# Patient Record
Sex: FEMALE | Race: BLACK OR AFRICAN AMERICAN | Employment: PART TIME | RURAL
[De-identification: names, ages, dates, MRNs, and addresses within clinical notes are randomized per-mention and may not be internally consistent; named-entity substitution may affect disease eponyms.]

---
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
## 2022-01-03 NOTE — PROGRESS NOTES
Sp/w patient, confirmed with 2 identifiers. Advised of positive results. Informed pt that if she starts having trouble breathing and is unable to speak in full sentences, pt needs to go to the nearest ER. PT expressed understanding.  KT

## 2022-01-05 ENCOUNTER — TELEPHONE (OUTPATIENT)
Dept: FAMILY MEDICINE CLINIC | Age: 22
End: 2022-01-05

## 2022-01-05 NOTE — TELEPHONE ENCOUNTER
----- Message from Cheli Ledezma sent at 1/5/2022 10:59 AM EST -----  Subject: Message to Provider    QUESTIONS  Information for Provider? The patient is calling today to request to have   results of Covid test sent to her email address? [email or web address]. Please call the patient to advise  ---------------------------------------------------------------------------  --------------  CALL BACK INFO  What is the best way for the office to contact you? OK to leave message on   [employer]  Preferred Call Back Phone Number? [phone]  ---------------------------------------------------------------------------  --------------  SCRIPT ANSWERS  Relationship to Patient?  Self

## 2022-03-15 ENCOUNTER — HOSPITAL ENCOUNTER (EMERGENCY)
Age: 22
Discharge: HOME | End: 2022-03-15
Attending: FAMILY MEDICINE
Payer: MEDICAID

## 2022-03-15 VITALS
WEIGHT: 160 LBS | HEIGHT: 68 IN | HEART RATE: 92 BPM | TEMPERATURE: 99.9 F | SYSTOLIC BLOOD PRESSURE: 121 MMHG | BODY MASS INDEX: 24.25 KG/M2 | DIASTOLIC BLOOD PRESSURE: 74 MMHG | OXYGEN SATURATION: 99 % | RESPIRATION RATE: 19 BRPM

## 2022-03-15 DIAGNOSIS — J02.9 VIRAL PHARYNGITIS: Primary | ICD-10-CM

## 2022-03-15 LAB — DEPRECATED S PYO AG THROAT QL EIA: NEGATIVE

## 2022-03-15 PROCEDURE — 99283 EMERGENCY DEPT VISIT LOW MDM: CPT

## 2022-03-15 PROCEDURE — 87070 CULTURE OTHR SPECIMN AEROBIC: CPT

## 2022-03-15 PROCEDURE — 87880 STREP A ASSAY W/OPTIC: CPT

## 2022-03-16 ENCOUNTER — VIRTUAL VISIT (OUTPATIENT)
Dept: FAMILY MEDICINE CLINIC | Age: 22
End: 2022-03-16
Payer: MEDICAID

## 2022-03-16 DIAGNOSIS — B97.89 SORE THROAT (VIRAL): Primary | ICD-10-CM

## 2022-03-16 DIAGNOSIS — J02.8 SORE THROAT (VIRAL): Primary | ICD-10-CM

## 2022-03-16 PROCEDURE — 99442 PR PHYS/QHP TELEPHONE EVALUATION 11-20 MIN: CPT | Performed by: NURSE PRACTITIONER

## 2022-03-16 RX ORDER — ACETAMINOPHEN 325 MG/1
TABLET ORAL
COMMUNITY

## 2022-03-16 NOTE — PROGRESS NOTES
Chief Complaint   Patient presents with    Sore Throat     f/[facility]     1. Have you been to the ER, urgent care clinic since your last visit? Hospitalized since your last visit? Yes [facility] for sore throat    2. Have you seen or consulted any other health care providers outside of the [address] [place] [address] since your last visit? Include any pap smears or colon screening. No  Abuse Screening Questionnaire 3/16/2022   Do you ever feel afraid of your partner? N   Are you in a relationship with someone who physically or mentally threatens you? N   Is it safe for you to go home? Y     3 most recent PHQ Screens 3/16/2022   Little interest or pleasure in doing things Not at all   Feeling down, depressed, irritable, or hopeless Not at all   Total Score PHQ 2 0   In the past year have you felt depressed or sad most days, even if you felt okay? -   Has there been a time in the past month when you have had serious thoughts about ending your life?  -   Have you ever in your whole life, tried to kill yourself or made a suicide attempt? -     Learning Assessment 1/30/2020   PRIMARY LEARNER Patient   BARRIERS PRIMARY LEARNER -   CO-LEARNER CAREGIVER -   PRIMARY LANGUAGE ENGLISH   LEARNER PREFERENCE PRIMARY LISTENING     DEMONSTRATION   ANSWERED BY patient   RELATIONSHIP SELF

## 2022-03-16 NOTE — PROGRESS NOTES
Darian Dupree is a 25 y.o. female, evaluated via audio-only technology on 3/16/2022 for Sore Throat (f/u [facility] ER last night)  c/o sore throat for the last 3 days. She has been taking diphenhydramine and guaifenesin for her symptoms but the do not seem to be improving. She has had no cough, congestion, fevers, abdominal pain, headaches, vomiting or diarrhea. Reviewed ER visit negative for strep. At present Ms. Otto Ruiz is feeling better today and requests a note for work. To return to work on 3/17/2022    Assessment & Plan:   Diagnoses and all orders for this visit:    1. Sore throat (viral)      The complexity of medical decision making for this visit is moderate         12  Subjective:       Prior to Admission medications    Medication Sig Start Date End Date Taking? Authorizing Provider   acetaminophen (TylenoL) 325 mg tablet Take  by mouth every four (4) hours as needed for Pain. Yes Provider, Historical   albuterol (PROVENTIL VENTOLIN) 2.5 mg /3 mL (0.083 %) nebu USE 1 VIAL VIA NEBULIZER EVERY 4 HOURS 10/6/21  Yes Vadim Cota NP   fluticasone propion-salmeteroL (ADVAIR/WIXELA) 100-50 mcg/dose diskus inhaler Take 1 Puff by inhalation two (2) times a day. 10/5/21  Yes Pio Ann MD   Nebulizer Accessories kit DX ; Asthma ( J45)   to be used with nebulizer as directed. 10/6/21   Vadim Cota NP   Dayna Hoose Inhub 250-50 mcg/dose diskus inhaler INHALE 1 PUFF BY MOUTH TWICE DAILY  Patient not taking: Reported on 3/16/2022 3/17/21   Harriett BURGOS, DO   inhalational spacing device 1 Each by Does Not Apply route as needed for Wheezing. Patient not taking: Reported on 3/16/2022 1/26/21   Lore Rogers,    albuterol (ProAir HFA) 90 mcg/actuation inhaler Take 2 Puffs by inhalation every four (4) hours as needed for Wheezing, Shortness of Breath, Respiratory Distress or Cough.   Patient not taking: Reported on 3/16/2022 9/25/20   Edel Roper MD   pimecrolimus (ELIDEL) 1 % topical cream Apply to affected area two (2) times a day. Patient not taking: Reported on 3/16/2022 9/11/20   Susie Sen NP   triamcinolone acetonide (KENALOG) 0.1 % ointment Apply  to affected area two (2) times a day. use thin layer  Patient not taking: Reported on 3/16/2022 4/27/20   Susie Sen NP   hydrOXYzine pamoate (VistariL) 25 mg capsule Take 1 Cap by mouth three (3) times daily as needed for Itching. Patient not taking: Reported on 3/16/2022 4/21/20   Lizbeth Persaud MD   montelukast (SINGULAIR) 10 mg tablet Take 1 Tab by mouth daily. Indications: controller medication for asthma  Patient not taking: Reported on 3/16/2022 1/30/20   Susie Sen NP   hydrocortisone valerate (WEST-GRECIA) 0.2 % ointment Apply  to affected area two (2) times a day. use thin layer  Patient not taking: Reported on 3/16/2022 12/30/19   Susie Sen NP     Patient Active Problem List   Diagnosis Code    Moderate persistent asthma without complication B52.02    Intrinsic eczema L20.84     Current Outpatient Medications   Medication Sig Dispense Refill    acetaminophen (TylenoL) 325 mg tablet Take  by mouth every four (4) hours as needed for Pain.  albuterol (PROVENTIL VENTOLIN) 2.5 mg /3 mL (0.083 %) nebu USE 1 VIAL VIA NEBULIZER EVERY 4 HOURS 30 Nebule 0    fluticasone propion-salmeteroL (ADVAIR/WIXELA) 100-50 mcg/dose diskus inhaler Take 1 Puff by inhalation two (2) times a day. 60 Each 0    Nebulizer Accessories kit DX ; Asthma ( J45)   to be used with nebulizer as directed. 1 Kit 0    Wixela Inhub 250-50 mcg/dose diskus inhaler INHALE 1 PUFF BY MOUTH TWICE DAILY (Patient not taking: Reported on 3/16/2022) 60 Each 5    inhalational spacing device 1 Each by Does Not Apply route as needed for Wheezing.  (Patient not taking: Reported on 3/16/2022) 1 Device 0    albuterol (ProAir HFA) 90 mcg/actuation inhaler Take 2 Puffs by inhalation every four (4) hours as needed for Wheezing, Shortness of Breath, Respiratory Distress or Cough. (Patient not taking: Reported on 3/16/2022) 1 Inhaler 2    pimecrolimus (ELIDEL) 1 % topical cream Apply  to affected area two (2) times a day. (Patient not taking: Reported on 3/16/2022) 30 g 0    triamcinolone acetonide (KENALOG) 0.1 % ointment Apply  to affected area two (2) times a day. use thin layer (Patient not taking: Reported on 3/16/2022) 30 g 0    hydrOXYzine pamoate (VistariL) 25 mg capsule Take 1 Cap by mouth three (3) times daily as needed for Itching. (Patient not taking: Reported on 3/16/2022) 20 Cap 0    montelukast (SINGULAIR) 10 mg tablet Take 1 Tab by mouth daily. Indications: controller medication for asthma (Patient not taking: Reported on 3/16/2022) 30 Tab 2    hydrocortisone valerate (WEST-GRECIA) 0.2 % ointment Apply  to affected area two (2) times a day. use thin layer (Patient not taking: Reported on 3/16/2022) 15 g 0     Allergies   Allergen Reactions    Rocephin [Ceftriaxone] Hives, Shortness of Breath and Swelling     Past Medical History:   Diagnosis Date    Asthma     Blood type B+     Bronchitis chronic     Community acquired pneumonia     Eczema     Otitis media     Reactive airway disease     Vision decreased 12/15/2011    conjunctivitis & early periorbital cellulitis     History reviewed. No pertinent surgical history. Family History   Problem Relation Age of Onset    Hypertension Father     Diabetes Sister     No Known Problems Mother      Social History     Tobacco Use    Smoking status: Never Smoker    Smokeless tobacco: Never Used   Substance Use Topics    Alcohol use: No       ROS  Pertinent items are noted on the HPI  Patient-Reported LMP: 1-       James Frankel was evaluated through a patient-initiated, synchronous (real-time) audio only encounter. She (or guardian if applicable) is aware that it is a billable service, which includes applicable co-pays, with coverage as determined by her insurance carrier.  This visit was conducted with the patient's (and/or Janiya Rojas guardian's) verbal consent. She has not had a related appointment within my department in the past 7 days or scheduled within the next 24 hours. The patient was located in a state where the provider was licensed to provide care.     Total Time: minutes: 11-20 minutes    Claudell Echevaria, NP

## 2022-03-16 NOTE — DISCHARGE INSTRUCTIONS
--Tylenol 1000 mg every 6 hours as needed for pain. --Ibuprofen 600 mg every 6 hours as needed for pain. --Cepacol lozenges as needed.

## 2022-03-16 NOTE — LETTER
NOTIFICATION RETURN TO WORK / SCHOOL    3/16/2022 1:03 PM    Ms. Abbe Hank [place] [id number]      To Whom It May Concern:    Mayco Alarcon is currently under the care of Megan Mullins. She will return to work: 03/17/2022    If there are questions or concerns please have the patient contact our office.         Sincerely,      Jesus Luo, NP

## 2022-03-18 LAB
BACTERIA SPEC CULT: NORMAL
SERVICE CMNT-IMP: NORMAL

## 2022-03-20 PROBLEM — J45.40 MODERATE PERSISTENT ASTHMA WITHOUT COMPLICATION: Status: ACTIVE | Noted: 2021-01-30

## 2022-03-20 PROBLEM — L20.84 INTRINSIC ECZEMA: Status: ACTIVE | Noted: 2021-01-30

## 2022-03-23 DIAGNOSIS — J45.40 MODERATE PERSISTENT ASTHMA WITHOUT COMPLICATION: ICD-10-CM

## 2022-03-23 RX ORDER — ALBUTEROL SULFATE 90 UG/1
2 AEROSOL, METERED RESPIRATORY (INHALATION)
Qty: 1 EACH | Refills: 11 | Status: SHIPPED | OUTPATIENT
Start: 2022-03-23

## 2022-03-23 NOTE — TELEPHONE ENCOUNTER
The patient's mother Margaretmary Osler is calling to have a refill sent in for the Albuterol Inhaler to [facility] in [place], Her call back number is [phone]

## 2022-03-24 ENCOUNTER — TELEPHONE (OUTPATIENT)
Dept: FAMILY MEDICINE CLINIC | Age: 22
End: 2022-03-24

## 2022-03-24 NOTE — TELEPHONE ENCOUNTER
The patient mother Eduard Early is calling stating a refill was sent for inhaler but is needing to be preauthorized.  [facility] is the Pharmacy

## 2022-03-24 NOTE — TELEPHONE ENCOUNTER
Mother informed albuterol denied by insurance. Mother states she does not need this RX but her [address] [place] [address] .  This refill was sent to provider for approval.

## 2022-03-25 DIAGNOSIS — J45.40 MODERATE PERSISTENT ASTHMA WITHOUT COMPLICATION: ICD-10-CM

## 2022-03-25 RX ORDER — FLUTICASONE PROPIONATE AND SALMETEROL 100; 50 UG/1; UG/1
1 POWDER RESPIRATORY (INHALATION) 2 TIMES DAILY
Qty: 60 EACH | Refills: 0 | Status: SHIPPED | OUTPATIENT
Start: 2022-03-25

## 2022-03-25 RX ORDER — FLUTICASONE PROPIONATE AND SALMETEROL 250; 50 UG/1; UG/1
POWDER RESPIRATORY (INHALATION)
Qty: 60 EACH | Refills: 5 | Status: SHIPPED | OUTPATIENT
Start: 2022-03-25 | End: 2022-10-21

## 2022-09-03 NOTE — PROGRESS NOTES
Patient presented to the office for an outdoors Covid 19 test. to R heel in surgical shoe/weight-bearing as tolerated

## 2022-10-21 RX ORDER — FLUTICASONE PROPIONATE AND SALMETEROL 250; 50 UG/1; UG/1
POWDER RESPIRATORY (INHALATION)
Qty: 60 EACH | Refills: 5 | Status: SHIPPED | OUTPATIENT
Start: 2022-10-21

## 2022-11-16 ENCOUNTER — HOSPITAL ENCOUNTER (EMERGENCY)
Age: 22
Discharge: HOME | End: 2022-11-16
Attending: FAMILY MEDICINE
Payer: MEDICAID

## 2022-11-16 VITALS
TEMPERATURE: 101 F | DIASTOLIC BLOOD PRESSURE: 56 MMHG | HEART RATE: 111 BPM | OXYGEN SATURATION: 97 % | WEIGHT: 160 LBS | BODY MASS INDEX: 24.25 KG/M2 | HEIGHT: 68 IN | RESPIRATION RATE: 16 BRPM | SYSTOLIC BLOOD PRESSURE: 119 MMHG

## 2022-11-16 DIAGNOSIS — B34.9 VIRAL SYNDROME: Primary | ICD-10-CM

## 2022-11-16 LAB
DEPRECATED S PYO AG THROAT QL EIA: NEGATIVE
FLUAV AG NPH QL IA: NEGATIVE
FLUBV AG NOSE QL IA: NEGATIVE

## 2022-11-16 PROCEDURE — 99283 EMERGENCY DEPT VISIT LOW MDM: CPT

## 2022-11-16 PROCEDURE — 87147 CULTURE TYPE IMMUNOLOGIC: CPT

## 2022-11-16 PROCEDURE — 87070 CULTURE OTHR SPECIMN AEROBIC: CPT

## 2022-11-16 PROCEDURE — 87804 INFLUENZA ASSAY W/OPTIC: CPT

## 2022-11-16 PROCEDURE — 87880 STREP A ASSAY W/OPTIC: CPT

## 2022-11-16 PROCEDURE — 74011250637 HC RX REV CODE- 250/637: Performed by: FAMILY MEDICINE

## 2022-11-16 RX ORDER — IBUPROFEN 600 MG/1
600 TABLET ORAL
Status: COMPLETED | OUTPATIENT
Start: 2022-11-16 | End: 2022-11-16

## 2022-11-16 RX ORDER — ALBUTEROL SULFATE 0.83 MG/ML
2.5 SOLUTION RESPIRATORY (INHALATION)
Qty: 25 ML | Refills: 0 | Status: SHIPPED | OUTPATIENT
Start: 2022-11-16

## 2022-11-16 RX ADMIN — IBUPROFEN 600 MG: 600 TABLET, FILM COATED ORAL at 21:45

## 2022-11-16 NOTE — Clinical Note
[address] [place] [address] EMERGENCY DEP  [address] [facility] [address] [place] [id number]  [phone]    Work/School Note    Date: 11/16/2022    To Whom It May concern:    Briana Giron was seen and treated today in the emergency room by the following provider(s):  Attending Provider: Arvind Valdes MD.      Briana Giron is excused from work/school on 11/16/2022 through 11/18/2022. She is medically clear to return to work/school on 11/19/2022.          Sincerely,          Lita Mendoza MD

## 2022-11-17 NOTE — DISCHARGE INSTRUCTIONS
--Ibuprofen 600 mg every 6 hours as needed for pain/fever. --Tylenol 1000 mg every 6 hours as needed for pain/fever. --Drink plenty of fluids.

## 2022-11-17 NOTE — ED PROVIDER NOTES
EMERGENCY DEPARTMENT HISTORY AND PHYSICAL EXAM          Date: 11/16/2022  Patient Name: Xiomara Higigns    History of Presenting Illness     Chief Complaint   Patient presents with    Fever    Flu Like Symptoms    Sore Throat       History Provided By: Patient    HPI: Xiomara Higgins is a 23 y.o. female, pmhx below, who presents to the ED with fever and sore throat that started this morning. No significant nasal congestion or cough. No ill contacts, that she is aware of. No Tylenol or ibuprofen at home. Able to drink fluids but it is painful. She has had no abdominal pain or nausea/vomiting/diarrhea. PCP: Minor Newsome NP    Allergies: Rocephin  Social Hx: No tobacco, vaping, EtOH, Illicit Drugs; Lives locally. Works at [employer]. There are no other complaints, changes, or physical findings at this time. Current Outpatient Medications   Medication Sig Dispense Refill    albuterol (PROVENTIL VENTOLIN) 2.5 mg /3 mL (0.083 %) nebu 3 mL by Nebulization route every four (4) hours as needed for Wheezing. 25 mL 0    fluticasone propion-salmeteroL (Advair Diskus) 250-50 mcg/dose diskus inhaler INHALE 1 PUFF BY MOUTH TWICE DAILY 60 Each 5    fluticasone propion-salmeteroL (ADVAIR/WIXELA) 100-50 mcg/dose diskus inhaler Take 1 Puff by inhalation two (2) times a day. 60 Each 0    acetaminophen (TylenoL) 325 mg tablet Take  by mouth every four (4) hours as needed for Pain. Nebulizer Accessories kit DX ; Asthma ( J45)   to be used with nebulizer as directed. 1 Kit 0    inhalational spacing device 1 Each by Does Not Apply route as needed for Wheezing. (Patient not taking: Reported on 3/16/2022) 1 Device 0    pimecrolimus (ELIDEL) 1 % topical cream Apply  to affected area two (2) times a day. (Patient not taking: Reported on 3/16/2022) 30 g 0    triamcinolone acetonide (KENALOG) 0.1 % ointment Apply  to affected area two (2) times a day.  use thin layer (Patient not taking: Reported on 3/16/2022) 30 g 0 hydrOXYzine pamoate (VistariL) 25 mg capsule Take 1 Cap by mouth three (3) times daily as needed for Itching. (Patient not taking: Reported on 3/16/2022) 20 Cap 0    montelukast (SINGULAIR) 10 mg tablet Take 1 Tab by mouth daily. Indications: controller medication for asthma (Patient not taking: Reported on 3/16/2022) 30 Tab 2    hydrocortisone valerate (WEST-GRECIA) 0.2 % ointment Apply  to affected area two (2) times a day. use thin layer (Patient not taking: Reported on 3/16/2022) 15 g 0       Past History     Past Medical History:  Past Medical History:   Diagnosis Date    Asthma     Blood type B+     Bronchitis chronic     Community acquired pneumonia     Eczema     Otitis media     Reactive airway disease     Vision decreased 12/15/2011    conjunctivitis & early periorbital cellulitis       Past Surgical History:  No past surgical history on file. Family History:  Family History   Problem Relation Age of Onset    Hypertension Father     Diabetes Sister     No Known Problems Mother        Social History:  Social History     Tobacco Use    Smoking status: Never    Smokeless tobacco: Never   Substance Use Topics    Alcohol use: No    Drug use: Never       Allergies: Allergies   Allergen Reactions    Rocephin [Ceftriaxone] Hives, Shortness of Breath and Swelling         Review of Systems   Review of Systems   Constitutional:  Positive for fever. Negative for appetite change. HENT:  Positive for sore throat. Respiratory:  Negative for cough and shortness of breath. Gastrointestinal:  Negative for diarrhea, nausea and vomiting. Physical Exam   Physical Exam  Vitals reviewed. Constitutional:       Appearance: She is normal weight. HENT:      Head: Normocephalic. Right Ear: Ear canal normal.      Left Ear: Ear canal normal.      Nose: No congestion. Mouth/Throat:      Mouth: Mucous membranes are moist. No oral lesions. Pharynx: Uvula midline. No uvula swelling.       Tonsils: No tonsillar exudate or tonsillar abscesses. 1+ on the right. 1+ on the left. Eyes:      Conjunctiva/sclera: Conjunctivae normal.      Pupils: Pupils are equal, round, and reactive to light. Cardiovascular:      Rate and Rhythm: Regular rhythm. Tachycardia present. Heart sounds: Normal heart sounds. Pulmonary:      Effort: Pulmonary effort is normal.      Breath sounds: Normal breath sounds. Skin:     General: Skin is warm and dry. Neurological:      Mental Status: She is alert. Diagnostic Study Results     Labs -     Recent Results (from the past 12 hour(s))   STREP AG SCREEN, GROUP A    Collection Time: 11/16/22  9:35 PM    Specimen: Swab; Throat   Result Value Ref Range    Group A Strep Ag ID Negative NEG     INFLUENZA A+B VIRAL AGS    Collection Time: 11/16/22  9:50 PM   Result Value Ref Range    Influenza A Antigen Negative NEG      Influenza B Antigen Negative NEG             Medical Decision Making   I am the first provider for this patient. I reviewed the vital signs, available nursing notes, past medical history, past surgical history, family history and social history. Vital Signs-Reviewed the patient's vital signs. Patient Vitals for the past 12 hrs:   Temp Pulse Resp BP SpO2   11/16/22 2250 (!) 101 °F (38.3 °C) (!) 111 16 (!) 119/56 97 %   11/16/22 2135 (!) 103 °F (39.4 °C) (!) 120 18 120/68 99 %       Pulse Oximetry Analysis - 97% on RA    Records Reviewed: Nursing Notes and Old Medical Records    Provider Notes (Medical Decision Making):   MDM: [facility] woman with fever, sore throat, tachycardia. DDx: strep, flu, viral pharyngitis. Could also be COVID but not tested at this time. ED Course:   Initial assessment performed. The patients presenting problems have been discussed, and they are in agreement with the care plan formulated and outlined with them. I have encouraged them to ask questions as they arise throughout their visit.         PROGRESS NOTE:  Pt given 600 mg ibuprofen, which brought her fever down to 101 from 103 after about 90 minutes. She was given instructions for viral syndrome and a note for work. Discharge note:  Pt re-evaluated and noted to be feeling better, ready for discharge. Updated pt on all final lab findings. Will follow up as instructed. All questions have been answered, pt voiced understanding and agreement with plan. Specific return precautions provided as well as instructions to return to the ED should sx worsen at any time. Vital signs stable for discharge. Critical Care Time: 0      Diagnosis     Clinical Impression:   1. Viral syndrome        PLAN:    --Ibuprofen 600 mg every 6 hours as needed for pain/fever. --Tylenol 1000 mg every 6 hours as needed for pain/fever. --Drink plenty of fluids.     2.   Follow-up Information       Follow up With Specialties Details Why Contact Magalis Abarca NP Nurse Practitioner In 2 days If symptoms worsen Annika Montgomery  [address] [facility] [phone]            Return to ED if worse     Disposition:  Home

## 2022-11-18 LAB
BACTERIA SPEC CULT: ABNORMAL
BACTERIA SPEC CULT: ABNORMAL
SERVICE CMNT-IMP: ABNORMAL

## 2023-04-03 ENCOUNTER — OFFICE VISIT (OUTPATIENT)
Dept: FAMILY MEDICINE CLINIC | Age: 23
End: 2023-04-03
Payer: MEDICAID

## 2023-04-03 DIAGNOSIS — E01.0 THYROMEGALY: ICD-10-CM

## 2023-04-03 DIAGNOSIS — Z00.00 WELLNESS EXAMINATION: Primary | ICD-10-CM

## 2023-04-03 DIAGNOSIS — J30.1 SEASONAL ALLERGIC RHINITIS DUE TO POLLEN: ICD-10-CM

## 2023-04-03 DIAGNOSIS — L20.84 INTRINSIC ECZEMA: ICD-10-CM

## 2023-04-03 PROCEDURE — 99395 PREV VISIT EST AGE 18-39: CPT | Performed by: NURSE PRACTITIONER

## 2023-04-03 RX ORDER — TRIAMCINOLONE ACETONIDE 1 MG/G
CREAM TOPICAL 2 TIMES DAILY
Qty: 15 G | Refills: 0 | Status: SHIPPED | OUTPATIENT
Start: 2023-04-03

## 2023-04-03 RX ORDER — CETIRIZINE HYDROCHLORIDE 10 MG/1
10 TABLET ORAL DAILY
Qty: 30 TABLET | Refills: 2 | Status: SHIPPED | OUTPATIENT
Start: 2023-04-03

## 2023-04-03 RX ORDER — AZELASTINE 1 MG/ML
1 SPRAY, METERED NASAL DAILY
Qty: 1 EACH | Refills: 0 | Status: SHIPPED | OUTPATIENT
Start: 2023-04-03

## 2023-04-03 NOTE — PROGRESS NOTES
Chief Complaint   Patient presents with    Allergies       HPI:     is a 23 y.o. female in today for a check up and wellness visit. She is living at home and working at [employer] in [place]. She has a few concerns today. Firstly, she reports allergy symptoms. She is not taking any medicine for them right now. She also reports eczema, worse around the R eye. She has what she believes is a hemorrhoid x several years and can't seem to make it go away. She is not sexually active and never has been. Allergies   Allergen Reactions    Rocephin [Ceftriaxone] Hives, Shortness of Breath and Swelling       Current Outpatient Medications   Medication Sig    cetirizine (ZYRTEC) 10 mg tablet Take 1 Tablet by mouth daily. azelastine (ASTELIN) 137 mcg (0.1 %) nasal spray 1 Helen by Both Nostrils route daily. Use in each nostril as directed    triamcinolone acetonide (KENALOG) 0.1 % topical cream Apply  to affected area two (2) times a day. use thin layer    albuterol (PROVENTIL VENTOLIN) 2.5 mg /3 mL (0.083 %) nebu 3 mL by Nebulization route every four (4) hours as needed for Wheezing. fluticasone propion-salmeteroL (Advair Diskus) 250-50 mcg/dose diskus inhaler INHALE 1 PUFF BY MOUTH TWICE DAILY    fluticasone propion-salmeteroL (ADVAIR/WIXELA) 100-50 mcg/dose diskus inhaler Take 1 Puff by inhalation two (2) times a day. acetaminophen (TYLENOL) 325 mg tablet Take  by mouth every four (4) hours as needed for Pain. Nebulizer Accessories kit DX ; Asthma ( J45)   to be used with nebulizer as directed. inhalational spacing device 1 Each by Does Not Apply route as needed for Wheezing. No current facility-administered medications for this visit.        Past Medical History:   Diagnosis Date    Asthma     Blood type B+     Bronchitis chronic     Community acquired pneumonia     Eczema     Otitis media     Reactive airway disease     Vision decreased 12/15/2011    conjunctivitis & early periorbital cellulitis       Family History   Problem Relation Age of Onset    Hypertension Father     Diabetes Sister     No Known Problems Mother          Review of Systems    A comprehensive review of systems was negative except for that written in the HPI. Patient is not experiencing chest pain radiating to the jaw and/or down the arms. Physical Examination:    /60 (BP 1 Location: Right arm, BP Patient Position: Sitting, BP Cuff Size: Adult long)   Pulse (!) 101   Temp 98.4 °F (36.9 °C) (Oral)   Resp 22   Ht 5' 8\" (1.727 m)   Wt 160 lb 9.6 oz (72.8 kg)   SpO2 98%   BMI 24.42 kg/m²     Wt Readings from Last 3 Encounters:   04/03/23 160 lb 9.6 oz (72.8 kg)   11/16/22 160 lb (72.6 kg)   03/15/22 160 lb (72.6 kg) (88 %, Z= 1.16)*     * Growth percentiles are based on [facility] (Girls, 2-20 Years) data. Constitutional: WDWN Female in no acute distress  HENT:  NC/AT, TMs pearly gray, OP: clear  EYES: EOMI, PERRL  Neck:  Supple, no JVD, mass or bruit. + thyromegaly without palpable nodule. Respiratory:  Respirations even and unlabored without use of accessory muscles, CTA throughout without wheezes, rales, rubs or rhonchi. Symmetrical chest expansion. Cardiac: RRR no clicks, murmurs, gallops, or rubs  : Fleshy skin tag to anus  Musculoskeletal:  No cyanosis, clubbing or edema of extremities. Moves all extremities without difficulty. Neurologic:  Smooth, even gait without assistance, CN 2-12 grossly intact. Skin: intact and warm to the touch, erythematous fine papular rash to R eyelid  Lymphadenopathy: no cervical or supraclavicular nodes  Psych: Pleasant and appropriate. Judgment normal. Alert and oriented x 3. ASSESSMENT AND PLAN:       ICD-10-CM ICD-9-CM    1. Wellness examination  Z00.00 V70.0       2. Thyromegaly  E01.0 240.9 US THYROID/PARATHYROID/SOFT TISS      3. Intrinsic eczema  L20.84 691.8       4. Seasonal allergic rhinitis due to pollen  J30.1 477.0         Tx as above.  Thyroid USN ordered at \[facility]\"". Discussed atopy, how allergies, asthma, and eczema go hand in hand. Contact inform provided for Dr. Fam Mitchell, dermatologist. She declines labs today. Medication Side Effects and Warnings were discussed with patient:  yes  Patient Labs were reviewed:  yes  Patient Past Records were reviewed:  yes    Patient aware of plan of care and verbalized understanding. Questions answered. RTC yearly, or sooner if needed.     Jessica Lopez NP

## 2023-04-03 NOTE — PATIENT INSTRUCTIONS
We are not drawing any blood today. You need to think about coming back in November or later to do a pelvic exam and pap smear. I did notice your thyroid gland on your neck feels a little large. I want you to get an ultrasound at the hospital in [facility]. You will need to call the number on the slip of paper I gave you today to set this up once you get your work schedule. You will tell them your name and date of birth and that you want it done at [facility]. We will call you back when we see the results. I am not worried about this, I am doing this out of an abundance of caution. I'm sending in a daily antihistamine for allergies and a nasal spray. Use these in the morning. We use Dr. Margoth Shetty office for dermatology. His number is [phone]. You can call and make your own appt. I hear Jon Mora is really good!

## 2023-04-03 NOTE — PROGRESS NOTES
YES Answers must have Comments  1. \"Have you been to the ER, urgent care clinic since your last visit? Hospitalized since your last visit? \"    [] YES   [x] NO       2. Have you seen or consulted any other health care providers outside of [address] [place] [address] since your last visit?     [] YES   [x] NO       3. For patients aged 39-70: Have you had a colorectal cancer screening such as a colonoscopy/FIT/Cologuard? Nurse/CMA to request records if not in chart   [] YES   [x] NO   [] NA, based on age    If the patient is female:      4. For female patients aged 41-77: [facility] Six you had a mammogram in the last two years?  Nurse/CMA to request records if not in chart   [] YES   [x] NO   [] NA, based on age    11. For female patients aged 21-65: [facility] Six you had a pap smear?   Nurse/CMA to request records if not in chart   [] YES   [x] NO  [] NA, based on age

## 2023-05-05 RX ORDER — FLUTICASONE PROPIONATE AND SALMETEROL 250; 50 UG/1; UG/1
POWDER RESPIRATORY (INHALATION)
Qty: 60 EACH | Refills: 5 | Status: SHIPPED | OUTPATIENT
Start: 2023-05-05

## 2023-07-03 RX ORDER — FLUTICASONE PROPIONATE AND SALMETEROL 100; 50 UG/1; UG/1
POWDER RESPIRATORY (INHALATION)
Qty: 60 EACH | Refills: 1 | OUTPATIENT
Start: 2023-07-03

## 2023-07-03 RX ORDER — FLUTICASONE PROPIONATE AND SALMETEROL 250; 50 UG/1; UG/1
POWDER RESPIRATORY (INHALATION)
Qty: 60 EACH | Refills: 1 | Status: SHIPPED | OUTPATIENT
Start: 2023-07-03

## 2023-07-03 RX ORDER — FLUTICASONE PROPIONATE AND SALMETEROL 100; 50 UG/1; UG/1
POWDER RESPIRATORY (INHALATION)
Qty: 60 EACH | Refills: 1 | Status: SHIPPED | OUTPATIENT
Start: 2023-07-03

## 2023-07-03 RX ORDER — FLUTICASONE PROPIONATE AND SALMETEROL 50; 250 UG/1; UG/1
POWDER RESPIRATORY (INHALATION)
Qty: 60 EACH | Refills: 1 | Status: SHIPPED | OUTPATIENT
Start: 2023-07-03

## 2023-07-03 NOTE — TELEPHONE ENCOUNTER
Last OV 04/03/2023  Requested Prescriptions     Pending Prescriptions Disp Refills    fluticasone-salmeterol (ADVAIR) 250-50 MCG/ACT AEPB diskus inhaler [Pharmacy Med Name: FLUTICASONE/SALM DISK 250/50MCG 60S] 60 each 1     Sig: INHALE 1 PUFF BY MOUTH TWICE DAILY

## 2023-07-03 NOTE — TELEPHONE ENCOUNTER
Last OV 3/16/2022  Requested Prescriptions     Pending Prescriptions Disp Refills    ADVAIR DISKUS 250-50 MCG/ACT AEPB diskus inhaler [Pharmacy Med Name: ADVAIR DISKUS 250/50MCG (YELLOW) 60] 60 each 1     Sig: INHALE 1 PUFF BY MOUTH TWICE DAILY

## 2023-07-03 NOTE — TELEPHONE ENCOUNTER
Last OV 3/16/2022  Requested Prescriptions     Pending Prescriptions Disp Refills    WIXELA INHUB 100-50 MCG/ACT AEPB diskus inhaler [Pharmacy Med Name: Dineshert Collie DISKUS 100/50MCG 60S] 60 each 1     Sig: INHALE 1 PUFF BY MOUTH TWICE DAILY     Refused Prescriptions Disp Refills    WIXELA INHUB 100-50 MCG/ACT AEPB diskus inhaler [Pharmacy Med Name: Dineshert Collie DISKUS 100/50MCG 60S] 60 each      Sig: INHALE 1 PUFF BY MOUTH TWICE DAILY

## 2023-07-06 ENCOUNTER — TELEPHONE (OUTPATIENT)
Age: 23
End: 2023-07-06

## 2023-07-10 RX ORDER — FLUTICASONE PROPIONATE AND SALMETEROL 250; 50 UG/1; UG/1
POWDER RESPIRATORY (INHALATION)
Qty: 60 EACH | Refills: 11 | Status: SHIPPED | OUTPATIENT
Start: 2023-07-10

## 2023-07-13 RX ORDER — CETIRIZINE HYDROCHLORIDE 10 MG/1
10 TABLET ORAL DAILY
COMMUNITY
Start: 2023-06-10 | End: 2023-07-13 | Stop reason: SDUPTHER

## 2023-07-13 RX ORDER — CETIRIZINE HYDROCHLORIDE 10 MG/1
10 TABLET ORAL DAILY
Qty: 90 TABLET | Refills: 1 | Status: SHIPPED | OUTPATIENT
Start: 2023-07-13

## 2023-07-13 RX ORDER — CETIRIZINE HYDROCHLORIDE 10 MG/1
TABLET ORAL
Qty: 30 TABLET | Refills: 5 | OUTPATIENT
Start: 2023-07-13

## 2023-09-05 ENCOUNTER — OFFICE VISIT (OUTPATIENT)
Age: 23
End: 2023-09-05
Payer: MEDICAID

## 2023-09-05 VITALS
SYSTOLIC BLOOD PRESSURE: 118 MMHG | HEIGHT: 68 IN | DIASTOLIC BLOOD PRESSURE: 76 MMHG | BODY MASS INDEX: 24.92 KG/M2 | WEIGHT: 164.4 LBS | HEART RATE: 93 BPM | OXYGEN SATURATION: 100 %

## 2023-09-05 DIAGNOSIS — J30.1 SEASONAL ALLERGIC RHINITIS DUE TO POLLEN: Primary | ICD-10-CM

## 2023-09-05 DIAGNOSIS — J02.8 ACUTE PHARYNGITIS DUE TO OTHER SPECIFIED ORGANISMS: ICD-10-CM

## 2023-09-05 DIAGNOSIS — J45.40 MODERATE PERSISTENT ASTHMA WITHOUT COMPLICATION: ICD-10-CM

## 2023-09-05 PROCEDURE — 99214 OFFICE O/P EST MOD 30 MIN: CPT | Performed by: NURSE PRACTITIONER

## 2023-09-05 RX ORDER — FLUTICASONE PROPIONATE AND SALMETEROL 250; 50 UG/1; UG/1
POWDER RESPIRATORY (INHALATION)
Qty: 60 EACH | Refills: 11 | Status: SHIPPED | OUTPATIENT
Start: 2023-09-05

## 2023-09-05 RX ORDER — CLINDAMYCIN HYDROCHLORIDE 300 MG/1
300 CAPSULE ORAL 2 TIMES DAILY
Qty: 20 CAPSULE | Refills: 0 | Status: SHIPPED | OUTPATIENT
Start: 2023-09-05 | End: 2023-09-15

## 2023-09-05 RX ORDER — MONTELUKAST SODIUM 10 MG/1
10 TABLET ORAL NIGHTLY
Qty: 30 TABLET | Refills: 5 | Status: SHIPPED | OUTPATIENT
Start: 2023-09-05

## 2023-09-05 NOTE — PATIENT INSTRUCTIONS
Continue cetirizine in the morning. Add prescription montelukast at bedtime. Take these daily for sinus. I'm also sending in a 10 day course of antibiotics to take for sinuses. Come back in 2-3 weeks to follow up.

## 2023-09-27 ENCOUNTER — OFFICE VISIT (OUTPATIENT)
Age: 23
End: 2023-09-27
Payer: MEDICAID

## 2023-09-27 VITALS
WEIGHT: 165.8 LBS | DIASTOLIC BLOOD PRESSURE: 70 MMHG | TEMPERATURE: 97.9 F | SYSTOLIC BLOOD PRESSURE: 110 MMHG | OXYGEN SATURATION: 98 % | BODY MASS INDEX: 25.13 KG/M2 | RESPIRATION RATE: 20 BRPM | HEART RATE: 88 BPM | HEIGHT: 68 IN

## 2023-09-27 DIAGNOSIS — H10.33 ACUTE BACTERIAL CONJUNCTIVITIS OF BOTH EYES: ICD-10-CM

## 2023-09-27 DIAGNOSIS — H61.23 BILATERAL IMPACTED CERUMEN: ICD-10-CM

## 2023-09-27 DIAGNOSIS — J45.40 MODERATE PERSISTENT ASTHMA WITHOUT COMPLICATION: Primary | ICD-10-CM

## 2023-09-27 DIAGNOSIS — J30.89 NON-SEASONAL ALLERGIC RHINITIS, UNSPECIFIED TRIGGER: ICD-10-CM

## 2023-09-27 PROCEDURE — 99213 OFFICE O/P EST LOW 20 MIN: CPT | Performed by: NURSE PRACTITIONER

## 2023-09-27 PROCEDURE — 69209 REMOVE IMPACTED EAR WAX UNI: CPT | Performed by: NURSE PRACTITIONER

## 2023-09-27 RX ORDER — POLYMYXIN B SULFATE AND TRIMETHOPRIM 1; 10000 MG/ML; [USP'U]/ML
1 SOLUTION OPHTHALMIC EVERY 6 HOURS
Qty: 2 ML | Refills: 0 | Status: SHIPPED | OUTPATIENT
Start: 2023-09-27 | End: 2023-10-07

## 2023-09-27 RX ORDER — FLUTICASONE PROPIONATE 50 MCG
2 SPRAY, SUSPENSION (ML) NASAL DAILY
Qty: 48 G | Refills: 1 | Status: SHIPPED | OUTPATIENT
Start: 2023-09-27

## 2023-09-27 ASSESSMENT — PATIENT HEALTH QUESTIONNAIRE - PHQ9
SUM OF ALL RESPONSES TO PHQ QUESTIONS 1-9: 0
SUM OF ALL RESPONSES TO PHQ QUESTIONS 1-9: 0
2. FEELING DOWN, DEPRESSED OR HOPELESS: 0
SUM OF ALL RESPONSES TO PHQ QUESTIONS 1-9: 0
SUM OF ALL RESPONSES TO PHQ QUESTIONS 1-9: 0

## 2023-10-24 ENCOUNTER — NURSE ONLY (OUTPATIENT)
Age: 23
End: 2023-10-24

## 2023-10-24 DIAGNOSIS — Z02.1 PHYSICAL EXAM, PRE-EMPLOYMENT: Primary | ICD-10-CM

## 2023-10-26 ENCOUNTER — OFFICE VISIT (OUTPATIENT)
Age: 23
End: 2023-10-26
Payer: MEDICAID

## 2023-10-26 VITALS
HEART RATE: 98 BPM | WEIGHT: 164 LBS | TEMPERATURE: 97.2 F | DIASTOLIC BLOOD PRESSURE: 70 MMHG | BODY MASS INDEX: 25.74 KG/M2 | HEIGHT: 67 IN | RESPIRATION RATE: 20 BRPM | OXYGEN SATURATION: 97 % | SYSTOLIC BLOOD PRESSURE: 110 MMHG

## 2023-10-26 DIAGNOSIS — J45.40 MODERATE PERSISTENT ASTHMA WITHOUT COMPLICATION: ICD-10-CM

## 2023-10-26 PROCEDURE — 99213 OFFICE O/P EST LOW 20 MIN: CPT | Performed by: NURSE PRACTITIONER

## 2023-10-26 RX ORDER — FLUTICASONE PROPIONATE AND SALMETEROL 250; 50 UG/1; UG/1
POWDER RESPIRATORY (INHALATION)
Qty: 60 EACH | Refills: 11 | Status: SHIPPED | OUTPATIENT
Start: 2023-10-26

## 2023-10-26 RX ORDER — MONTELUKAST SODIUM 10 MG/1
10 TABLET ORAL NIGHTLY
Qty: 30 TABLET | Refills: 0 | Status: SHIPPED | OUTPATIENT
Start: 2023-10-26

## 2023-10-26 RX ORDER — CETIRIZINE HYDROCHLORIDE 10 MG/1
10 TABLET ORAL DAILY
Qty: 90 TABLET | Refills: 1 | Status: SHIPPED | OUTPATIENT
Start: 2023-10-26

## 2023-10-26 ASSESSMENT — PATIENT HEALTH QUESTIONNAIRE - PHQ9
SUM OF ALL RESPONSES TO PHQ QUESTIONS 1-9: 0
2. FEELING DOWN, DEPRESSED OR HOPELESS: 0
1. LITTLE INTEREST OR PLEASURE IN DOING THINGS: 0
SUM OF ALL RESPONSES TO PHQ QUESTIONS 1-9: 0
SUM OF ALL RESPONSES TO PHQ9 QUESTIONS 1 & 2: 0
SUM OF ALL RESPONSES TO PHQ QUESTIONS 1-9: 0
SUM OF ALL RESPONSES TO PHQ QUESTIONS 1-9: 0

## 2023-10-26 NOTE — PROGRESS NOTES
Subjective:     Chintan Pop is a 23 y.o. female who presents today with the following:  Chief Complaint   Patient presents with    Asthma     refills       Patient Active Problem List   Diagnosis    Moderate persistent asthma without complication    Intrinsic eczema         COMPLIANT WITH MEDICATION:   Asthma; MS. Wilde Seen endorses that her asthma symptoms of shortness of breath and wheezing flare up in the fall. She is starting to experience symptoms  since last week. She is requesting a refill of Advair and Zyrtec. We discussed restarting singular and she is receptive. depression screening addressed not at risk    abuse screening addressed denies    learning assessment addressed reviewed nurses notes    fall risk addressed not at risk    HM: addressed She declines immunizations today.     ROS:  Gen: denies fever, chills, fatigue, weight loss, weight gain  HEENT:denies blurry vision, nasal congestion, sore throat  Resp: denies dypsnea, cough, wheezing  CV: denies chest pain radiating to the jaws or arms, palpitations, lower extremity edema  Abd: denies nausea, vomiting, diarrhea, constipation  Neuro: denies numbness/tingling  Endo: denies polyuria, polydipsia, heat/cold intolerance  Heme: no lymphadenopathy    Allergies   Allergen Reactions    Ceftriaxone Hives, Shortness Of Breath and Swelling         Current Outpatient Medications:     montelukast (SINGULAIR) 10 MG tablet, Take 1 tablet by mouth nightly, Disp: 30 tablet, Rfl: 0    cetirizine (ZYRTEC) 10 MG tablet, Take 1 tablet by mouth daily, Disp: 90 tablet, Rfl: 1    fluticasone-salmeterol (ADVAIR DISKUS) 250-50 MCG/ACT AEPB diskus inhaler, INHALE 1 PUFF BY MOUTH TWICE DAILY, Disp: 60 each, Rfl: 11    fluticasone (FLONASE) 50 MCG/ACT nasal spray, 2 sprays by Each Nostril route daily, Disp: 48 g, Rfl: 1    albuterol (PROVENTIL) (2.5 MG/3ML) 0.083% nebulizer solution, USE 3 MLS VIA NEBULIZAITON EVERY 4 HOURS FOR 30 DOSES, Disp: 150 mL, Rfl: 5

## 2023-11-13 DIAGNOSIS — J45.40 MODERATE PERSISTENT ASTHMA WITHOUT COMPLICATION: ICD-10-CM

## 2023-11-13 RX ORDER — FLUTICASONE PROPIONATE AND SALMETEROL 250; 50 UG/1; UG/1
POWDER RESPIRATORY (INHALATION)
Qty: 60 EACH | Refills: 11 | Status: SHIPPED | OUTPATIENT
Start: 2023-11-13

## 2023-11-30 DIAGNOSIS — H10.33 ACUTE BACTERIAL CONJUNCTIVITIS OF BOTH EYES: ICD-10-CM

## 2023-11-30 RX ORDER — FLUTICASONE PROPIONATE 50 MCG
2 SPRAY, SUSPENSION (ML) NASAL DAILY
Qty: 48 G | Refills: 1 | Status: SHIPPED | OUTPATIENT
Start: 2023-11-30

## 2023-12-07 DIAGNOSIS — J45.40 MODERATE PERSISTENT ASTHMA WITHOUT COMPLICATION: ICD-10-CM

## 2023-12-08 DIAGNOSIS — J45.40 MODERATE PERSISTENT ASTHMA WITHOUT COMPLICATION: ICD-10-CM

## 2023-12-08 RX ORDER — FLUTICASONE PROPIONATE AND SALMETEROL 250; 50 UG/1; UG/1
POWDER RESPIRATORY (INHALATION)
Qty: 60 EACH | Refills: 11 | Status: SHIPPED | OUTPATIENT
Start: 2023-12-08 | End: 2023-12-08 | Stop reason: SDUPTHER

## 2023-12-10 RX ORDER — FLUTICASONE PROPIONATE AND SALMETEROL 250; 50 UG/1; UG/1
POWDER RESPIRATORY (INHALATION)
Qty: 60 EACH | Refills: 11 | Status: SHIPPED | OUTPATIENT
Start: 2023-12-10

## 2024-01-01 NOTE — PATIENT INSTRUCTIONS
Influenza (Flu) Vaccine (Inactivated or Recombinant): What You Need to Know  Why get vaccinated? Influenza (\"flu\") is a contagious disease that spreads around the [place] every winter, usually between October and May. Flu is caused by influenza viruses and is spread mainly by coughing, sneezing, and close contact. Anyone can get flu. Flu strikes suddenly and can last several days. Symptoms vary by age, but can include:  · Fever/chills. · Sore throat. · Muscle aches. · Fatigue. · Cough. · Headache. · Runny or stuffy nose. Flu can also lead to pneumonia and blood infections, and cause diarrhea and seizures in children. If you have a medical condition, such as heart or lung disease, flu can make it worse. Flu is more dangerous for some people. Infants and young children, people 72years of age and older, pregnant women, and people with certain health conditions or a weakened immune system are at greatest risk. Each year thousands of people in the [facility] die from flu, and many more are hospitalized. Flu vaccine can:  · Keep you from getting flu. · Make flu less severe if you do get it. · Keep you from spreading flu to your family and other people. Inactivated and recombinant flu vaccines  A dose of flu vaccine is recommended every flu season. Children 6 months through 6years of age may need two doses during the same flu season. Everyone else needs only one dose each flu season. Some inactivated flu vaccines contain a very small amount of a mercury-based preservative called thimerosal. Studies have not shown thimerosal in vaccines to be harmful, but flu vaccines that do not contain thimerosal are available. There is no live flu virus in flu shots. They cannot cause the flu. There are many flu viruses, and they are always changing. Each year a new flu vaccine is made to protect against three or four viruses that are likely to cause disease in the upcoming flu season.  But even when the vaccine doesn't exactly match these viruses, it may still provide some protection. Flu vaccine cannot prevent:  · Flu that is caused by a virus not covered by the vaccine. · Illnesses that look like flu but are not. Some people should not get this vaccine  Tell the person who is giving you the vaccine:  · If you have any severe (life-threatening) allergies. If you ever had a life-threatening allergic reaction after a dose of flu vaccine, or have a severe allergy to any part of this vaccine, you may be advised not to get vaccinated. Most, but not all, types of flu vaccine contain a small amount of egg protein. · If you ever had Guillain-Barré syndrome (also called GBS) Some people with a history of GBS should not get this vaccine. This should be discussed with your doctor. · If you are not feeling well. It is usually okay to get flu vaccine when you have a mild illness, but you might be asked to come back when you feel better. Risks of a vaccine reaction  With any medicine, including vaccines, there is a chance of reactions. These are usually mild and go away on their own, but serious reactions are also possible. Most people who get a flu shot do not have any problems with it. Minor problems following a flu shot include:  · Soreness, redness, or swelling where the shot was given  · Hoarseness  · Sore, red or itchy eyes  · Cough  · Fever  · Aches  · Headache  · Itching  · Fatigue  If these problems occur, they usually begin soon after the shot and last 1 or 2 days. More serious problems following a flu shot can include the following:  · There may be a small increased risk of Guillain-Barré Syndrome (GBS) after inactivated flu vaccine. This risk has been estimated at 1 or 2 additional cases per million people vaccinated. This is much lower than the risk of severe complications from flu, which can be prevented by flu vaccine.   · [place] children who get the flu shot along with pneumococcal vaccine (PCV13) and/or DTaP vaccine at the same time might be slightly more likely to have a seizure caused by fever. Ask your doctor for more information. Tell your doctor if a child who is getting flu vaccine has ever had a seizure  Problems that could happen after any injected vaccine:  · People sometimes faint after a medical procedure, including vaccination. Sitting or lying down for about 15 minutes can help prevent fainting, and injuries caused by a fall. Tell your doctor if you feel dizzy, or have vision changes or ringing in the ears. · Some people get severe pain in the shoulder and have difficulty moving the arm where a shot was given. This happens very rarely. · Any medication can cause a severe allergic reaction. Such reactions from a vaccine are very rare, estimated at about 1 in a million doses, and would happen within a few minutes to a few hours after the vaccination. As with any medicine, there is a very remote chance of a vaccine causing a serious injury or death. The safety of vaccines is always being monitored. For more information, visit: www.cdc.gov/vaccinesafety/. What if there is a serious reaction? What should I look for? · Look for anything that concerns you, such as signs of a severe allergic reaction, very high fever, or unusual behavior. Signs of a severe allergic reaction can include hives, swelling of the face and throat, difficulty breathing, a fast heartbeat, dizziness, and weakness - usually within a few minutes to a few hours after the vaccination. What should I do? · If you think it is a severe allergic reaction or other emergency that can't wait, call 9-1-1 and get the person to the nearest hospital. Otherwise, call your doctor. · Reactions should be reported to the \"Vaccine Adverse Event Reporting System\" (VAERS). Your doctor should file this report, or you can do it yourself through the VAERS website at www.vaers. [facility].gov, or by calling 0-721.818.8200.   [employer] does not give medical advice. The National Vaccine Injury Compensation Program  The National Vaccine Injury Compensation Program (VICP) is a federal program that was created to compensate people who may have been injured by certain vaccines. Persons who believe they may have been injured by a vaccine can learn about the program and about filing a claim by calling 8-198.675.8956 or visiting the [employer] website at www.[facility].gov/vaccinecompensation. There is a time limit to file a claim for compensation. How can I learn more? · Ask your healthcare provider. He or she can give you the vaccine package insert or suggest other sources of information. · Call your local or state health department. · Contact the Centers for Disease Control and Prevention (CDC):  ¨ Call 8-823.974.4512 (1-800-CDC-INFO) or  ¨ Visit CDC's website at www.cdc.gov/flu  Vaccine Information Statement  Inactivated Influenza Vaccine  8/7/2015)  42 UHeidi Mondragon 982ET-02  Department of Health and Human Services  Centers for Disease Control and Prevention  Many Vaccine Information Statements are available in Cypriot and other languages. See www.immunize.org/vis. Muchas hojas de información sobre vacunas están disponibles en español y en otros idiomas. Visite www.immunize.org/vis. Care instructions adapted under license by [employer] (which disclaims liability or warranty for this information). If you have questions about a medical condition or this instruction, always ask your healthcare professional. [place] [address] any warranty or liability for your use of this information. Hepatitis A Vaccine: What You Need to Know  Why get vaccinated? Hepatitis A is a serious liver disease. It is caused by the hepatitis A virus (HAV). HAV is spread from person to person through contact with the feces (stool) of people who are infected, which can easily happen if someone does not wash his or her hands properly.  You can also get hepatitis A from food, water, or objects contaminated with HAV. Symptoms of hepatitis A can include:  · Fever, fatigue, loss of appetite, nausea, vomiting, and/or joint pain. · Severe stomach pains and diarrhea (mainly in children). · Jaundice (yellow skin or eyes, dark urine, zhang-colored bowel movements). These symptoms usually appear 2 to 6 weeks after exposure and usually last less than 2 months, although some people can be ill for as long as 6 months. If you have hepatitis A, you may be too ill to work. Children often do not have symptoms, but most adults do. You can spread HAV without having symptoms. Hepatitis A can cause liver failure and death, although this is rare and occurs more commonly in persons 48years of age or older and persons with other liver diseases, such as hepatitis B or C. Hepatitis A vaccine can prevent hepatitis A. Hepatitis A vaccines were recommended in the [facility] beginning in 1996. Since then, the number of cases reported each year in the [place] has dropped from around 31,000 cases to fewer than 1,500 cases. Hepatitis A vaccine  Hepatitis A vaccine is an inactivated (killed) vaccine. You will need 2 doses for long-lasting protection. These doses should be given at least 6 months apart. Children are routinely vaccinated between their first and second birthdays (15 through 22 months of age). Older children and adolescents can get the vaccine after 23 months. Adults who have not been vaccinated previously and want to be protected against hepatitis A can also get the vaccine. You should get hepatitis A vaccine if you:  · Are traveling to countries where hepatitis A is common. · Are a man who has sex with other men. · Use illegal drugs. · Have a chronic liver disease such as hepatitis B or hepatitis C.  · Are being treated with clotting-factor concentrates. · Work with hepatitis A-infected animals or in a hepatitis A research laboratory.   · Expect to have close personal contact with an international adoptee from a country where hepatitis A is common. Ask your healthcare provider if you want more information about any of these groups. There are no known risks to getting hepatitis A vaccine at the same time as other vaccines. Some people should not get this vaccine  Tell the person who is giving you the vaccine:  · If you have any severe, life-threatening allergies. If you ever had a life-threatening allergic reaction after a dose of hepatitis A vaccine, or have a severe allergy to any part of this vaccine, you may be advised not to get vaccinated. Ask your health care provider if you want information about vaccine components. · If you are not feeling well. If you have a mild illness, such as a cold, you can probably get the vaccine today. If you are moderately or severely ill, you should probably wait until you recover. Your doctor can advise you. Risks of a vaccine reaction  With any medicine, including vaccines, there is a chance of side effects. These are usually mild and go away on their own, but serious reactions are also possible. Most people who get hepatitis A vaccine do not have any problems with it. Minor problems following hepatitis A vaccine include:  · Soreness or redness where the shot was given  · Low-grade fever  · Headache  · Tiredness  If these problems occur, they usually begin soon after the shot and last 1 or 2 days. Your doctor can tell you more about these reactions. Other problems that could happen after this vaccine:  · People sometimes faint after a medical procedure, including vaccination. Sitting or lying down for about 15 minutes can help prevent fainting, and injuries caused by a fall. Tell your provider if you feel dizzy, or have vision changes or ringing in the ears. · Some people get shoulder pain that can be more severe and longer lasting than the more routine soreness that can follow injections. This happens very rarely.   · Any medication can cause a severe allergic reaction. Such reactions from a vaccine are very rare, estimated at about 1 in a million doses, and would happen within a few minutes to a few hours after the vaccination. As with any medicine, there is a very remote chance of a vaccine causing a serious injury or death. The safety of vaccines is always being monitored. For more information, visit: www.cdc.gov/vaccinesafety. What if there is a serious problem? What should I look for? · Look for anything that concerns you, such as signs of a severe allergic reaction, very high fever, or unusual behavior. Signs of a severe allergic reaction can include hives, swelling of the face and throat, difficulty breathing, a fast heartbeat, dizziness, and weakness. These would usually start a few minutes to a few hours after the vaccination. What should I do? · If you think it is a severe allergic reaction or other emergency that can't wait, call call 911and get to the nearest hospital. Otherwise, call your clinic. · Afterward, the reaction should be reported to the Vaccine Adverse Event Reporting System (VAERS). Your doctor should file this report, or you can do it yourself through the VAERS web site at www.vaers. hhs.gov, or by calling 3-583.876.5028. [employer] does not give medical advice. The National Vaccine Injury Compensation Program  The National Vaccine Injury Compensation Program (VICP) is a federal program that was created to compensate people who may have been injured by certain vaccines. Persons who believe they may have been injured by a vaccine can learn about the program and about filing a claim by calling 9-155.795.6799 or visiting the [employer] website at www.[facility].gov/vaccinecompensation. There is a time limit to file a claim for compensation. How can I learn more? · Ask your healthcare provider. He or she can give you the vaccine package insert or suggest other sources of information.   · Call your local or [facility] department. · Contact the Centers for Disease Control and Prevention (CDC):  ¨ Call 8-598.285.2431 (1-800-CDC-INFO). ¨ Visit CDC's website at www.cdc.gov/vaccines. Vaccine Information Statement  Hepatitis A Vaccine  7/20/2016  42 U. S.C. § 300aa-26  U. S. Department of Health and Human Services  Centers for Disease Control and Prevention  Many Vaccine Information Statements are available in [place] and other languages. See www.immunize.org/vis. Hojas de información sobre vacunas están disponibles en español y en otros idiomas. Visite www.immunize.org/vis. Care instructions adapted under license by [employer] (which disclaims liability or warranty for this information). If you have questions about a medical condition or this instruction, always ask your healthcare professional. [address] any warranty or liability for your use of this information. Well Visit, 12 years to Maida Kee Teen: Care Instructions  Your Care Instructions  Your teen may be busy with school, sports, clubs, and friends. Your teen may need some help managing his or her time with activities, homework, and getting enough sleep and eating healthy foods. Most young teens tend to focus on themselves as they seek to gain independence. They are learning more ways to solve problems and to think about things. While they are building confidence, they may feel insecure. Their peers may replace you as a source of support and advice. But they still value you and need you to be involved in their life. Follow-up care is a key part of your child's treatment and safety. Be sure to make and go to all appointments, and call your doctor if your child is having problems. It's also a good idea to know your child's test results and keep a list of the medicines your child takes. How can you care for your child at home? Eating and a healthy weight  · Encourage healthy eating habits.  Your teen needs nutritious meals and healthy snacks each day. Stock up on fruits and vegetables. Have nonfat and low-fat dairy foods available. · Do not eat much fast food. Offer healthy snacks that are low in sugar, fat, and salt instead of candy, chips, and other junk foods. · Encourage your teen to drink water when he or she is thirsty instead of soda or juice drinks. · Make meals a family time, and set a good example by making it an important time of the day for sharing. Healthy habits  · Encourage your teen to be active for at least one hour each day. Plan family activities, such as trips to the park, walks, bike rides, swimming, and gardening. · Limit TV or video to no more than 1 or 2 hours a day. Check programs for violence, bad language, and sex. · Do not smoke or allow others to smoke around your teen. If you need help quitting, talk to your doctor about stop-smoking programs and medicines. These can increase your chances of quitting for good. Be a good model so your teen will not want to try smoking. Safety  · Make your rules clear and consistent. Be fair and set a good example. · Show your teen that seat belts are important by wearing yours every time you drive. Make sure everyone mariela up. · Make sure your teen wears pads and a helmet that fits properly when he or she rides a bike or scooter or when skateboarding or in-line skating. · It is safest not to have a gun in the house. If you do, keep it unloaded and locked up. Lock ammunition in a separate place. · Teach your teen that underage drinking can be harmful. It can lead to making poor choices. Tell your teen to call for a ride if there is any problem with drinking. Parenting  · Try to accept the natural changes in your teen and your relationship with him or her. · Know that your teen may not want to do as many family activities. · Respect your teen's privacy. Be clear about any safety concerns you have.   · Have clear rules, but be flexible as your teen tries to be more independent. Set consequences for breaking the rules. · Listen when your teen wants to talk. This will build his or her confidence that you care and will work with your teen to have a good relationship. Help your teen decide which activities are okay to do on his or her own, such as staying alone at home or going out with friends. · Spend some time with your teen doing what he or she likes to do. This will help your communication and relationship. Talk about sexuality  · Start talking about sexuality early. This will make it less awkward each time. Be patient. Give yourselves time to get comfortable with each other. Start the conversations. Your teen may be interested but too embarrassed to ask. · Create an open environment. Let your teen know that you are always willing to talk. Listen carefully. This will reduce confusion and help you understand what is truly on your teen's mind. · Communicate your values and beliefs. Your teen can use your values to develop his or her own set of beliefs. · Talk about the pros and cons of not having sex, condom use, and birth control before your teen is sexually active. Talk to your teen about the chance of unwanted pregnancy. If your teen has had unsafe sex, one choice is emergency contraceptive pills (ECPs). ECPs can prevent pregnancy if birth control was not used; but ECPs are most useful if started within 72 hours of having had sex. · Talk to your teen about common STIs (sexually transmitted infections), such as chlamydia. This is a common STI that can cause infertility if it is not treated. Chlamydia screening is recommended yearly for all sexually active young women. School  Tell your teen why you think school is important. Show interest in your teen's school. Encourage your teen to join a school team or activity. If your teen is having trouble with classes, get a  for him or her.  If your teen is having problems with friends, other students, or teachers, work with your teen and the school staff to find out what is wrong. Immunizations  Flu immunization is recommended once a year for all children ages 7 months and older. Talk to your doctor if your teen did not yet get the vaccines for human papillomavirus (HPV), meningococcal disease, and tetanus, diphtheria, and pertussis. When should you call for help? Watch closely for changes in your teen's health, and be sure to contact your doctor if:  ? · You are concerned that your teen is not growing or learning normally for his or her age. ? · You are worried about your teen's behavior. ? · You have other questions or concerns. Where can you learn more? Go to http://stephanie-ki.info/. Enter O488 in the search box to learn more about \"Well Visit, 12 years to Karlene Reed Teen: Care Instructions. \"  Current as of: May 12, 2017  Content Version: 11.4  © 7273-6152 [employer]. Care instructions adapted under license by [employer] (which disclaims liability or warranty for this information). If you have questions about a medical condition or this instruction, always ask your healthcare professional. [address] any warranty or liability for your use of this information. Cambly Activation    Thank you for requesting access to [employer]. Please follow the instructions below to securely access and download your online medical record. [employer] allows you to send messages to your doctor, view your test results, renew your prescriptions, schedule appointments, and more. How Do I Sign Up? 1. In your internet browser, go to [email or web address]  2. Click on the First Time User? Click Here link in the Sign In box. You will be redirect to the New Member Sign Up page. 3. Enter your Cambly Access Code exactly as it appears below. You will not need to use this code after youve completed the sign-up process.  If you do not sign up before the expiration date, you must request a new code. Live On The Go Access Code: Activation code not generated  Patient is below the minimum allowed age for [employer] access. (This is the date your [employer] access code will )    4. Enter the last four digits of your Social Security Number (xxxx) and Date of Birth (mm/dd/yyyy) as indicated and click Submit. You will be taken to the next sign-up page. 5. Create a Live On The Go ID. This will be your [employer] On The Go login ID and cannot be changed, so think of one that is secure and easy to remember. 6. Create a [employer] Go password. You can change your password at any time. 7. Enter your Password Reset Question and Answer. This can be used at a later time if you forget your password. 8. Enter your e-mail address. You will receive e-mail notification when new information is available in [address] 9. Click Sign Up. You can now view and download portions of your medical record. 10. Click the Download Summary menu link to download a portable copy of your medical information. Additional Information    If you have questions, please visit the Frequently Asked Questions section of the [employer] website at [email or web address] Remember, [employer] On The Go is NOT to be used for urgent needs. For medical emergencies, dial 911. supine

## 2024-01-05 DIAGNOSIS — J45.40 MODERATE PERSISTENT ASTHMA WITHOUT COMPLICATION: ICD-10-CM

## 2024-01-05 RX ORDER — FLUTICASONE PROPIONATE AND SALMETEROL 250; 50 UG/1; UG/1
POWDER RESPIRATORY (INHALATION)
Qty: 60 EACH | Refills: 11 | Status: SHIPPED | OUTPATIENT
Start: 2024-01-05

## 2024-01-08 ENCOUNTER — TELEPHONE (OUTPATIENT)
Age: 24
End: 2024-01-08

## 2024-01-08 DIAGNOSIS — J45.40 MODERATE PERSISTENT ASTHMA WITHOUT COMPLICATION: ICD-10-CM

## 2024-01-08 RX ORDER — FLUTICASONE PROPIONATE AND SALMETEROL 250; 50 UG/1; UG/1
POWDER RESPIRATORY (INHALATION)
Qty: 60 EACH | Refills: 11 | Status: SHIPPED | OUTPATIENT
Start: 2024-01-08

## 2024-01-08 NOTE — TELEPHONE ENCOUNTER
Mayda states she spoke to Nicole earlier and she would need just the nebulizer machine to gp to the pharmacy.      [id number] - [place], [place] - [address] - P [phone] - F [phone] [id number] [place] [address]  Phone: [phone] Fax: [phone]

## 2024-01-08 NOTE — TELEPHONE ENCOUNTER
PC to inform pt her refill request was done on 01/05/2024, can check with the pharmacy for when ready for .  Also, was confirming if she is speaking of her nebulizer machine?  VM left to return the call.

## 2024-01-08 NOTE — TELEPHONE ENCOUNTER
Patient requesting refill on     Requested Prescriptions     Pending Prescriptions Disp Refills    fluticasone-salmeterol (ADVAIR DISKUS) 250-50 MCG/ACT AEPB diskus inhaler 60 each 11     Sig: INHALE 1 PUFF BY MOUTH TWICE DAILY        Last OV 10/26/2023

## 2024-01-08 NOTE — TELEPHONE ENCOUNTER
----- Message from Mayda Rincon sent at 1/8/2024  4:50 AM EST -----  Regarding: Inhaler refill   Contact: [phone]  Also, I wanted to know is it possible if I get a new breathing machine because mine has stopped working

## 2024-02-07 RX ORDER — TRIAMCINOLONE ACETONIDE 1 MG/G
OINTMENT TOPICAL 2 TIMES DAILY
Qty: 15 G | Refills: 1 | Status: SHIPPED | OUTPATIENT
Start: 2024-02-07

## 2024-02-07 NOTE — TELEPHONE ENCOUNTER
Medication Refill Request    Mayda Rincon is requesting a refill of the following medication(s):   triamcinolone (KENALOG) 0.1 % ointment   Please send refill to:     [id number] - [place], [place] - [address] - P [phone] - F [phone] [address] [place] [address]  Phone: [phone] Fax: [phone]

## 2024-02-07 NOTE — TELEPHONE ENCOUNTER
Patient requesting refill on     Requested Prescriptions     Pending Prescriptions Disp Refills    triamcinolone (KENALOG) 0.1 % ointment 15 g 1     Sig: Apply topically 2 times daily        Last OV 10/26/2023    2021 02:59

## 2024-02-19 RX ORDER — TRIAMCINOLONE ACETONIDE 1 MG/G
OINTMENT TOPICAL 2 TIMES DAILY
Qty: 15 G | Refills: 1 | Status: SHIPPED | OUTPATIENT
Start: 2024-02-19

## 2024-02-19 RX ORDER — IBUPROFEN 800 MG/1
800 TABLET ORAL 2 TIMES DAILY PRN
Qty: 60 TABLET | Refills: 5 | Status: SHIPPED | OUTPATIENT
Start: 2024-02-19

## 2024-03-07 ENCOUNTER — TELEPHONE (OUTPATIENT)
Age: 24
End: 2024-03-07

## 2024-03-07 NOTE — TELEPHONE ENCOUNTER
ML to see if she still needs ADVAIR PA or was she able to check with insurance to see what they will cover

## 2024-03-08 RX ORDER — MONTELUKAST SODIUM 10 MG/1
10 TABLET ORAL NIGHTLY
Qty: 90 TABLET | Refills: 1 | Status: SHIPPED | OUTPATIENT
Start: 2024-03-08

## 2024-04-01 DIAGNOSIS — J45.40 MODERATE PERSISTENT ASTHMA WITHOUT COMPLICATION: ICD-10-CM

## 2024-04-01 RX ORDER — FLUTICASONE PROPIONATE AND SALMETEROL 250; 50 UG/1; UG/1
POWDER RESPIRATORY (INHALATION)
Qty: 60 EACH | Refills: 11 | Status: SHIPPED | OUTPATIENT
Start: 2024-04-01

## 2024-04-01 NOTE — TELEPHONE ENCOUNTER
Patient requesting refill on     Requested Prescriptions     Pending Prescriptions Disp Refills    fluticasone-salmeterol (ADVAIR DISKUS) 250-50 MCG/ACT AEPB diskus inhaler 60 each 11     Sig: INHALE 1 PUFF BY MOUTH TWICE DAILY        Last OV Visit 10/26/23

## 2024-04-03 DIAGNOSIS — H10.33 ACUTE BACTERIAL CONJUNCTIVITIS OF BOTH EYES: ICD-10-CM

## 2024-04-03 RX ORDER — FLUTICASONE PROPIONATE 50 MCG
2 SPRAY, SUSPENSION (ML) NASAL DAILY
Qty: 48 G | Refills: 1 | Status: SHIPPED | OUTPATIENT
Start: 2024-04-03

## 2024-04-03 NOTE — TELEPHONE ENCOUNTER
Patient requesting refill on     Requested Prescriptions     Pending Prescriptions Disp Refills    fluticasone (FLONASE) 50 MCG/ACT nasal spray [Pharmacy Med Name: FLUTICASONE 50MCG NASAL SP (120) RX] 48 g 1     Sig: SHAKE LIQUID AND USE 2 SPRAYS IN EACH NOSTRIL DAILY        Last OV 10/26/2023

## 2024-04-28 ENCOUNTER — HOSPITAL ENCOUNTER (INPATIENT)
Facility: HOSPITAL | Age: 24
LOS: 2 days | Discharge: HOME | DRG: 720 | End: 2024-05-02
Attending: EMERGENCY MEDICINE | Admitting: INTERNAL MEDICINE
Payer: MEDICAID

## 2024-04-28 ENCOUNTER — APPOINTMENT (OUTPATIENT)
Facility: HOSPITAL | Age: 24
DRG: 720 | End: 2024-04-28
Payer: MEDICAID

## 2024-04-28 DIAGNOSIS — A41.9 SEPTICEMIA (HCC): Primary | ICD-10-CM

## 2024-04-28 LAB
ALBUMIN SERPL-MCNC: 3.9 G/DL (ref 3.5–5)
ALBUMIN/GLOB SERPL: 1 (ref 1.1–2.2)
ALP SERPL-CCNC: 90 U/L (ref 45–117)
ALT SERPL-CCNC: 22 U/L (ref 12–78)
ANION GAP SERPL CALC-SCNC: 13 MMOL/L (ref 5–15)
APPEARANCE UR: ABNORMAL
AST SERPL-CCNC: 16 U/L (ref 15–37)
BACTERIA URNS QL MICRO: ABNORMAL /HPF
BILIRUB SERPL-MCNC: 1 MG/DL (ref 0.2–1)
BILIRUB UR QL: NEGATIVE
BUN SERPL-MCNC: 9 MG/DL (ref 6–20)
BUN/CREAT SERPL: 9 (ref 12–20)
CALCIUM SERPL-MCNC: 9.2 MG/DL (ref 8.5–10.1)
CHLORIDE SERPL-SCNC: 98 MMOL/L (ref 97–108)
CO2 SERPL-SCNC: 23 MMOL/L (ref 21–32)
COLOR UR: ABNORMAL
CREAT SERPL-MCNC: 1.05 MG/DL (ref 0.55–1.02)
EPITH CASTS URNS QL MICRO: ABNORMAL /LPF
GLOBULIN SER CALC-MCNC: 3.8 G/DL (ref 2–4)
GLUCOSE SERPL-MCNC: 137 MG/DL (ref 65–100)
GLUCOSE UR STRIP.AUTO-MCNC: NEGATIVE MG/DL
HCG UR QL: NEGATIVE
HGB UR QL STRIP: NEGATIVE
KETONES UR QL STRIP.AUTO: NEGATIVE MG/DL
LACTATE SERPL-SCNC: 1.6 MMOL/L (ref 0.4–2)
LEUKOCYTE ESTERASE UR QL STRIP.AUTO: NEGATIVE
NITRITE UR QL STRIP.AUTO: NEGATIVE
PH UR STRIP: 6 (ref 5–8)
POTASSIUM SERPL-SCNC: 3.9 MMOL/L (ref 3.5–5.1)
PROT SERPL-MCNC: 7.7 G/DL (ref 6.4–8.2)
PROT UR STRIP-MCNC: NEGATIVE MG/DL
RBC #/AREA URNS HPF: ABNORMAL /HPF (ref 0–5)
SODIUM SERPL-SCNC: 134 MMOL/L (ref 136–145)
SP GR UR REFRACTOMETRY: 1.01 (ref 1–1.03)
URINE CULTURE IF INDICATED: ABNORMAL
UROBILINOGEN UR QL STRIP.AUTO: 0.2 EU/DL (ref 0.2–1)
WBC URNS QL MICRO: ABNORMAL /HPF (ref 0–4)

## 2024-04-28 PROCEDURE — 80307 DRUG TEST PRSMV CHEM ANLYZR: CPT

## 2024-04-28 PROCEDURE — 87040 BLOOD CULTURE FOR BACTERIA: CPT

## 2024-04-28 PROCEDURE — 85025 COMPLETE CBC W/AUTO DIFF WBC: CPT

## 2024-04-28 PROCEDURE — 71045 X-RAY EXAM CHEST 1 VIEW: CPT

## 2024-04-28 PROCEDURE — 87636 SARSCOV2 & INF A&B AMP PRB: CPT

## 2024-04-28 PROCEDURE — 6370000000 HC RX 637 (ALT 250 FOR IP): Performed by: EMERGENCY MEDICINE

## 2024-04-28 PROCEDURE — 87086 URINE CULTURE/COLONY COUNT: CPT

## 2024-04-28 PROCEDURE — 87077 CULTURE AEROBIC IDENTIFY: CPT

## 2024-04-28 PROCEDURE — 2580000003 HC RX 258: Performed by: EMERGENCY MEDICINE

## 2024-04-28 PROCEDURE — 81025 URINE PREGNANCY TEST: CPT

## 2024-04-28 PROCEDURE — 87154 CUL TYP ID BLD PTHGN 6+ TRGT: CPT

## 2024-04-28 PROCEDURE — 83605 ASSAY OF LACTIC ACID: CPT

## 2024-04-28 PROCEDURE — 93005 ELECTROCARDIOGRAM TRACING: CPT | Performed by: EMERGENCY MEDICINE

## 2024-04-28 PROCEDURE — 80053 COMPREHEN METABOLIC PANEL: CPT

## 2024-04-28 PROCEDURE — 96375 TX/PRO/DX INJ NEW DRUG ADDON: CPT

## 2024-04-28 PROCEDURE — 99285 EMERGENCY DEPT VISIT HI MDM: CPT

## 2024-04-28 PROCEDURE — 96365 THER/PROPH/DIAG IV INF INIT: CPT

## 2024-04-28 PROCEDURE — 6360000002 HC RX W HCPCS: Performed by: EMERGENCY MEDICINE

## 2024-04-28 PROCEDURE — 36415 COLL VENOUS BLD VENIPUNCTURE: CPT

## 2024-04-28 PROCEDURE — 81001 URINALYSIS AUTO W/SCOPE: CPT

## 2024-04-28 RX ORDER — 0.9 % SODIUM CHLORIDE 0.9 %
1000 INTRAVENOUS SOLUTION INTRAVENOUS ONCE
Status: COMPLETED | OUTPATIENT
Start: 2024-04-28 | End: 2024-04-28

## 2024-04-28 RX ORDER — ACETAMINOPHEN 500 MG
1000 TABLET ORAL
Status: COMPLETED | OUTPATIENT
Start: 2024-04-28 | End: 2024-04-28

## 2024-04-28 RX ORDER — KETOROLAC TROMETHAMINE 15 MG/ML
15 INJECTION, SOLUTION INTRAMUSCULAR; INTRAVENOUS
Status: COMPLETED | OUTPATIENT
Start: 2024-04-28 | End: 2024-04-28

## 2024-04-28 RX ADMIN — SODIUM CHLORIDE 1000 ML: 9 INJECTION, SOLUTION INTRAVENOUS at 22:54

## 2024-04-28 RX ADMIN — ACETAMINOPHEN 1000 MG: 500 TABLET ORAL at 22:55

## 2024-04-28 RX ADMIN — KETOROLAC TROMETHAMINE 15 MG: 15 INJECTION, SOLUTION INTRAMUSCULAR; INTRAVENOUS at 22:53

## 2024-04-28 ASSESSMENT — LIFESTYLE VARIABLES
HOW OFTEN DO YOU HAVE A DRINK CONTAINING ALCOHOL: NEVER
HOW MANY STANDARD DRINKS CONTAINING ALCOHOL DO YOU HAVE ON A TYPICAL DAY: PATIENT DOES NOT DRINK

## 2024-04-28 ASSESSMENT — PAIN SCALES - GENERAL: PAINLEVEL_OUTOF10: 7

## 2024-04-29 PROBLEM — A41.9 SEPSIS (HCC): Status: ACTIVE | Noted: 2024-04-29

## 2024-04-29 LAB
AMPHET UR QL SCN: NEGATIVE
ANION GAP SERPL CALC-SCNC: 11 MMOL/L (ref 5–15)
B PERT DNA SPEC QL NAA+PROBE: NOT DETECTED
BARBITURATES UR QL SCN: NEGATIVE
BASOPHILS # BLD: 0 K/UL (ref 0–0.1)
BASOPHILS # BLD: 0 K/UL (ref 0–0.1)
BASOPHILS NFR BLD: 0 % (ref 0–1)
BASOPHILS NFR BLD: 0 % (ref 0–1)
BENZODIAZ UR QL: NEGATIVE
BORDETELLA PARAPERTUSSIS BY PCR: NOT DETECTED
BUN SERPL-MCNC: 7 MG/DL (ref 6–20)
BUN/CREAT SERPL: 8 (ref 12–20)
C PNEUM DNA SPEC QL NAA+PROBE: NOT DETECTED
CALCIUM SERPL-MCNC: 8.3 MG/DL (ref 8.5–10.1)
CANNABINOIDS UR QL SCN: NEGATIVE
CHLORIDE SERPL-SCNC: 106 MMOL/L (ref 97–108)
CO2 SERPL-SCNC: 22 MMOL/L (ref 21–32)
COCAINE UR QL SCN: NEGATIVE
CREAT SERPL-MCNC: 0.85 MG/DL (ref 0.55–1.02)
DIFFERENTIAL METHOD BLD: ABNORMAL
DIFFERENTIAL METHOD BLD: ABNORMAL
EOSINOPHIL # BLD: 0.3 K/UL (ref 0–0.4)
EOSINOPHIL # BLD: 0.3 K/UL (ref 0–0.4)
EOSINOPHIL NFR BLD: 1 % (ref 0–7)
EOSINOPHIL NFR BLD: 1 % (ref 0–7)
ERYTHROCYTE [DISTWIDTH] IN BLOOD BY AUTOMATED COUNT: 15 % (ref 11.5–14.5)
ERYTHROCYTE [DISTWIDTH] IN BLOOD BY AUTOMATED COUNT: 15.2 % (ref 11.5–14.5)
EST. AVERAGE GLUCOSE BLD GHB EST-MCNC: 105 MG/DL
FLUAV RNA SPEC QL NAA+PROBE: NOT DETECTED
FLUAV SUBTYP SPEC NAA+PROBE: NOT DETECTED
FLUBV RNA SPEC QL NAA+PROBE: NOT DETECTED
FLUBV RNA SPEC QL NAA+PROBE: NOT DETECTED
GLUCOSE BLD STRIP.AUTO-MCNC: 144 MG/DL (ref 65–117)
GLUCOSE SERPL-MCNC: 116 MG/DL (ref 65–100)
HADV DNA SPEC QL NAA+PROBE: NOT DETECTED
HBA1C MFR BLD: 5.3 % (ref 4–5.6)
HCOV 229E RNA SPEC QL NAA+PROBE: NOT DETECTED
HCOV HKU1 RNA SPEC QL NAA+PROBE: NOT DETECTED
HCOV NL63 RNA SPEC QL NAA+PROBE: NOT DETECTED
HCOV OC43 RNA SPEC QL NAA+PROBE: NOT DETECTED
HCT VFR BLD AUTO: 29.2 % (ref 35–47)
HCT VFR BLD AUTO: 34 % (ref 35–47)
HETEROPH AB BLD QL IA: NEGATIVE
HGB BLD-MCNC: 10.6 G/DL (ref 11.5–16)
HGB BLD-MCNC: 9.2 G/DL (ref 11.5–16)
HIV 1+2 AB+HIV1 P24 AG SERPL QL IA: NONREACTIVE
HIV 1/2 RESULT COMMENT: NORMAL
HMPV RNA SPEC QL NAA+PROBE: NOT DETECTED
HPIV1 RNA SPEC QL NAA+PROBE: NOT DETECTED
HPIV2 RNA SPEC QL NAA+PROBE: NOT DETECTED
HPIV3 RNA SPEC QL NAA+PROBE: NOT DETECTED
HPIV4 RNA SPEC QL NAA+PROBE: NOT DETECTED
IMM GRANULOCYTES # BLD AUTO: 0.3 K/UL (ref 0–0.04)
IMM GRANULOCYTES # BLD AUTO: 0.3 K/UL (ref 0–0.04)
IMM GRANULOCYTES NFR BLD AUTO: 1 % (ref 0–0.5)
IMM GRANULOCYTES NFR BLD AUTO: 1 % (ref 0–0.5)
LYMPHOCYTES # BLD: 1 K/UL (ref 0.8–3.5)
LYMPHOCYTES # BLD: 1 K/UL (ref 0.8–3.5)
LYMPHOCYTES NFR BLD: 4 % (ref 12–49)
LYMPHOCYTES NFR BLD: 4 % (ref 12–49)
Lab: NORMAL
M PNEUMO DNA SPEC QL NAA+PROBE: NOT DETECTED
MCH RBC QN AUTO: 23.1 PG (ref 26–34)
MCH RBC QN AUTO: 23.4 PG (ref 26–34)
MCHC RBC AUTO-ENTMCNC: 31.2 G/DL (ref 30–36.5)
MCHC RBC AUTO-ENTMCNC: 31.5 G/DL (ref 30–36.5)
MCV RBC AUTO: 74.2 FL (ref 80–99)
MCV RBC AUTO: 74.3 FL (ref 80–99)
METHADONE UR QL: NEGATIVE
MONOCYTES # BLD: 1.8 K/UL (ref 0–1)
MONOCYTES # BLD: 2.5 K/UL (ref 0–1)
MONOCYTES NFR BLD: 10 % (ref 5–13)
MONOCYTES NFR BLD: 7 % (ref 5–13)
NEUTS SEG # BLD: 21.2 K/UL (ref 1.8–8)
NEUTS SEG # BLD: 21.8 K/UL (ref 1.8–8)
NEUTS SEG NFR BLD: 84 % (ref 32–75)
NEUTS SEG NFR BLD: 87 % (ref 32–75)
NRBC # BLD: 0 K/UL (ref 0–0.01)
NRBC # BLD: 0 K/UL (ref 0–0.01)
NRBC BLD-RTO: 0 PER 100 WBC
NRBC BLD-RTO: 0 PER 100 WBC
OPIATES UR QL: NEGATIVE
PCP UR QL: NEGATIVE
PLATELET # BLD AUTO: 308 K/UL (ref 150–400)
PLATELET # BLD AUTO: 364 K/UL (ref 150–400)
PMV BLD AUTO: 10 FL (ref 8.9–12.9)
PMV BLD AUTO: 10.3 FL (ref 8.9–12.9)
POTASSIUM SERPL-SCNC: 3.7 MMOL/L (ref 3.5–5.1)
PROCALCITONIN SERPL-MCNC: 0.06 NG/ML
RBC # BLD AUTO: 3.93 M/UL (ref 3.8–5.2)
RBC # BLD AUTO: 4.58 M/UL (ref 3.8–5.2)
RBC MORPH BLD: ABNORMAL
RETICS # AUTO: 0.04 M/UL (ref 0.02–0.08)
RETICS/RBC NFR AUTO: 1.1 % (ref 0.7–2.1)
RSV RNA SPEC QL NAA+PROBE: NOT DETECTED
RV+EV RNA SPEC QL NAA+PROBE: NOT DETECTED
SARS-COV-2 RNA RESP QL NAA+PROBE: NOT DETECTED
SARS-COV-2 RNA RESP QL NAA+PROBE: NOT DETECTED
SERVICE CMNT-IMP: ABNORMAL
SODIUM SERPL-SCNC: 139 MMOL/L (ref 136–145)
TSH SERPL DL<=0.05 MIU/L-ACNC: 0.6 UIU/ML (ref 0.36–3.74)
WBC # BLD AUTO: 25.2 K/UL (ref 3.6–11)
WBC # BLD AUTO: 25.3 K/UL (ref 3.6–11)

## 2024-04-29 PROCEDURE — 84145 PROCALCITONIN (PCT): CPT

## 2024-04-29 PROCEDURE — 6360000002 HC RX W HCPCS: Performed by: EMERGENCY MEDICINE

## 2024-04-29 PROCEDURE — 6360000002 HC RX W HCPCS: Performed by: INTERNAL MEDICINE

## 2024-04-29 PROCEDURE — 6370000000 HC RX 637 (ALT 250 FOR IP): Performed by: INTERNAL MEDICINE

## 2024-04-29 PROCEDURE — 2580000003 HC RX 258: Performed by: EMERGENCY MEDICINE

## 2024-04-29 PROCEDURE — 83540 ASSAY OF IRON: CPT

## 2024-04-29 PROCEDURE — 83036 HEMOGLOBIN GLYCOSYLATED A1C: CPT

## 2024-04-29 PROCEDURE — 2580000003 HC RX 258: Performed by: INTERNAL MEDICINE

## 2024-04-29 PROCEDURE — 94761 N-INVAS EAR/PLS OXIMETRY MLT: CPT

## 2024-04-29 PROCEDURE — 84443 ASSAY THYROID STIM HORMONE: CPT

## 2024-04-29 PROCEDURE — G0378 HOSPITAL OBSERVATION PER HR: HCPCS

## 2024-04-29 PROCEDURE — 80048 BASIC METABOLIC PNL TOTAL CA: CPT

## 2024-04-29 PROCEDURE — 83550 IRON BINDING TEST: CPT

## 2024-04-29 PROCEDURE — 87389 HIV-1 AG W/HIV-1&-2 AB AG IA: CPT

## 2024-04-29 PROCEDURE — 82962 GLUCOSE BLOOD TEST: CPT

## 2024-04-29 PROCEDURE — 85045 AUTOMATED RETICULOCYTE COUNT: CPT

## 2024-04-29 PROCEDURE — 86308 HETEROPHILE ANTIBODY SCREEN: CPT

## 2024-04-29 PROCEDURE — 94640 AIRWAY INHALATION TREATMENT: CPT

## 2024-04-29 PROCEDURE — 85025 COMPLETE CBC W/AUTO DIFF WBC: CPT

## 2024-04-29 PROCEDURE — 0202U NFCT DS 22 TRGT SARS-COV-2: CPT

## 2024-04-29 PROCEDURE — 36415 COLL VENOUS BLD VENIPUNCTURE: CPT

## 2024-04-29 RX ORDER — MAGNESIUM SULFATE IN WATER 40 MG/ML
2000 INJECTION, SOLUTION INTRAVENOUS PRN
Status: DISCONTINUED | OUTPATIENT
Start: 2024-04-29 | End: 2024-04-29

## 2024-04-29 RX ORDER — ACETAMINOPHEN 325 MG/1
650 TABLET ORAL EVERY 6 HOURS PRN
Status: DISCONTINUED | OUTPATIENT
Start: 2024-04-29 | End: 2024-05-02 | Stop reason: HOSPADM

## 2024-04-29 RX ORDER — CETIRIZINE HYDROCHLORIDE 10 MG/1
10 TABLET ORAL DAILY
Status: DISCONTINUED | OUTPATIENT
Start: 2024-04-29 | End: 2024-05-02 | Stop reason: HOSPADM

## 2024-04-29 RX ORDER — SODIUM CHLORIDE 9 MG/ML
INJECTION, SOLUTION INTRAVENOUS PRN
Status: DISCONTINUED | OUTPATIENT
Start: 2024-04-29 | End: 2024-05-02 | Stop reason: HOSPADM

## 2024-04-29 RX ORDER — ACETAMINOPHEN 650 MG/1
650 SUPPOSITORY RECTAL EVERY 6 HOURS PRN
Status: DISCONTINUED | OUTPATIENT
Start: 2024-04-29 | End: 2024-05-02 | Stop reason: HOSPADM

## 2024-04-29 RX ORDER — SODIUM CHLORIDE 0.9 % (FLUSH) 0.9 %
5-40 SYRINGE (ML) INJECTION EVERY 12 HOURS SCHEDULED
Status: DISCONTINUED | OUTPATIENT
Start: 2024-04-29 | End: 2024-05-02 | Stop reason: HOSPADM

## 2024-04-29 RX ORDER — SODIUM CHLORIDE 0.9 % (FLUSH) 0.9 %
5-40 SYRINGE (ML) INJECTION PRN
Status: DISCONTINUED | OUTPATIENT
Start: 2024-04-29 | End: 2024-05-02 | Stop reason: HOSPADM

## 2024-04-29 RX ORDER — POTASSIUM CHLORIDE 750 MG/1
40 TABLET, FILM COATED, EXTENDED RELEASE ORAL PRN
Status: DISCONTINUED | OUTPATIENT
Start: 2024-04-29 | End: 2024-04-29

## 2024-04-29 RX ORDER — ACETAMINOPHEN 325 MG/1
650 TABLET ORAL EVERY 6 HOURS PRN
Status: DISCONTINUED | OUTPATIENT
Start: 2024-04-29 | End: 2024-04-29

## 2024-04-29 RX ORDER — ALBUTEROL SULFATE 2.5 MG/3ML
2.5 SOLUTION RESPIRATORY (INHALATION) EVERY 4 HOURS PRN
Status: DISCONTINUED | OUTPATIENT
Start: 2024-04-29 | End: 2024-05-02 | Stop reason: HOSPADM

## 2024-04-29 RX ORDER — GLUCAGON 1 MG/ML
1 KIT INJECTION PRN
Status: DISCONTINUED | OUTPATIENT
Start: 2024-04-29 | End: 2024-05-01

## 2024-04-29 RX ORDER — DEXTROSE MONOHYDRATE 100 MG/ML
INJECTION, SOLUTION INTRAVENOUS CONTINUOUS PRN
Status: DISCONTINUED | OUTPATIENT
Start: 2024-04-29 | End: 2024-05-01

## 2024-04-29 RX ORDER — MONTELUKAST SODIUM 10 MG/1
10 TABLET ORAL NIGHTLY
Status: DISCONTINUED | OUTPATIENT
Start: 2024-04-29 | End: 2024-05-02 | Stop reason: HOSPADM

## 2024-04-29 RX ORDER — 0.9 % SODIUM CHLORIDE 0.9 %
1000 INTRAVENOUS SOLUTION INTRAVENOUS ONCE
Status: COMPLETED | OUTPATIENT
Start: 2024-04-29 | End: 2024-04-29

## 2024-04-29 RX ORDER — ENOXAPARIN SODIUM 100 MG/ML
40 INJECTION SUBCUTANEOUS DAILY
Status: DISCONTINUED | OUTPATIENT
Start: 2024-04-29 | End: 2024-05-02 | Stop reason: HOSPADM

## 2024-04-29 RX ORDER — POLYETHYLENE GLYCOL 3350 17 G/17G
17 POWDER, FOR SOLUTION ORAL DAILY PRN
Status: DISCONTINUED | OUTPATIENT
Start: 2024-04-29 | End: 2024-05-02 | Stop reason: HOSPADM

## 2024-04-29 RX ORDER — INSULIN LISPRO 100 [IU]/ML
0-4 INJECTION, SOLUTION INTRAVENOUS; SUBCUTANEOUS
Status: DISCONTINUED | OUTPATIENT
Start: 2024-04-29 | End: 2024-04-29

## 2024-04-29 RX ORDER — ONDANSETRON 2 MG/ML
4 INJECTION INTRAMUSCULAR; INTRAVENOUS EVERY 6 HOURS PRN
Status: DISCONTINUED | OUTPATIENT
Start: 2024-04-29 | End: 2024-04-29

## 2024-04-29 RX ORDER — ONDANSETRON 2 MG/ML
4 INJECTION INTRAMUSCULAR; INTRAVENOUS EVERY 6 HOURS PRN
Status: DISCONTINUED | OUTPATIENT
Start: 2024-04-29 | End: 2024-05-02 | Stop reason: HOSPADM

## 2024-04-29 RX ORDER — KETOROLAC TROMETHAMINE 15 MG/ML
15 INJECTION, SOLUTION INTRAMUSCULAR; INTRAVENOUS EVERY 6 HOURS PRN
Status: DISCONTINUED | OUTPATIENT
Start: 2024-04-29 | End: 2024-05-01

## 2024-04-29 RX ORDER — SODIUM CHLORIDE 9 MG/ML
INJECTION, SOLUTION INTRAVENOUS CONTINUOUS
Status: DISCONTINUED | OUTPATIENT
Start: 2024-04-29 | End: 2024-05-01

## 2024-04-29 RX ORDER — INSULIN LISPRO 100 [IU]/ML
0-4 INJECTION, SOLUTION INTRAVENOUS; SUBCUTANEOUS NIGHTLY
Status: DISCONTINUED | OUTPATIENT
Start: 2024-04-29 | End: 2024-04-29

## 2024-04-29 RX ORDER — POTASSIUM CHLORIDE 7.45 MG/ML
10 INJECTION INTRAVENOUS PRN
Status: DISCONTINUED | OUTPATIENT
Start: 2024-04-29 | End: 2024-04-29

## 2024-04-29 RX ORDER — ONDANSETRON 4 MG/1
4 TABLET, ORALLY DISINTEGRATING ORAL EVERY 8 HOURS PRN
Status: DISCONTINUED | OUTPATIENT
Start: 2024-04-29 | End: 2024-05-02 | Stop reason: HOSPADM

## 2024-04-29 RX ADMIN — PIPERACILLIN AND TAZOBACTAM 4500 MG: 4; .5 INJECTION, POWDER, LYOPHILIZED, FOR SOLUTION INTRAVENOUS at 00:09

## 2024-04-29 RX ADMIN — ARFORMOTEROL TARTRATE: 15 SOLUTION RESPIRATORY (INHALATION) at 08:39

## 2024-04-29 RX ADMIN — VANCOMYCIN HYDROCHLORIDE 1000 MG: 1 INJECTION, POWDER, LYOPHILIZED, FOR SOLUTION INTRAVENOUS at 00:53

## 2024-04-29 RX ADMIN — MOMETASONE FUROATE AND FORMOTEROL FUMARATE DIHYDRATE 2 PUFF: 100; 5 AEROSOL RESPIRATORY (INHALATION) at 20:15

## 2024-04-29 RX ADMIN — ACETAMINOPHEN 650 MG: 325 TABLET ORAL at 12:25

## 2024-04-29 RX ADMIN — CETIRIZINE HYDROCHLORIDE 10 MG: 10 TABLET, FILM COATED ORAL at 09:31

## 2024-04-29 RX ADMIN — SODIUM CHLORIDE: 9 INJECTION, SOLUTION INTRAVENOUS at 08:00

## 2024-04-29 RX ADMIN — SODIUM CHLORIDE: 9 INJECTION, SOLUTION INTRAVENOUS at 17:38

## 2024-04-29 RX ADMIN — SODIUM CHLORIDE 1000 ML: 9 INJECTION, SOLUTION INTRAVENOUS at 06:28

## 2024-04-29 RX ADMIN — VANCOMYCIN HYDROCHLORIDE 1000 MG: 1 INJECTION, POWDER, LYOPHILIZED, FOR SOLUTION INTRAVENOUS at 02:17

## 2024-04-29 RX ADMIN — MONTELUKAST 10 MG: 10 TABLET, FILM COATED ORAL at 21:19

## 2024-04-29 ASSESSMENT — PAIN DESCRIPTION - ORIENTATION
ORIENTATION: POSTERIOR
ORIENTATION: POSTERIOR

## 2024-04-29 ASSESSMENT — PAIN SCALES - GENERAL
PAINLEVEL_OUTOF10: 1
PAINLEVEL_OUTOF10: 4
PAINLEVEL_OUTOF10: 4
PAINLEVEL_OUTOF10: 0
PAINLEVEL_OUTOF10: 0

## 2024-04-29 ASSESSMENT — PAIN DESCRIPTION - LOCATION
LOCATION: HEAD
LOCATION: HEAD

## 2024-04-29 ASSESSMENT — PAIN DESCRIPTION - DESCRIPTORS: DESCRIPTORS: ACHING

## 2024-04-29 ASSESSMENT — PAIN - FUNCTIONAL ASSESSMENT: PAIN_FUNCTIONAL_ASSESSMENT: ACTIVITIES ARE NOT PREVENTED

## 2024-04-29 NOTE — TELEPHONE ENCOUNTER
From: Mayda Rincon  To: Office of Carolyn Glez  Sent: 4/28/2024 7:36 AM EDT  Subject: Medication Renewal Request    Refills have been requested for the following medications:     albuterol (PROVENTIL) (2.5 MG/3ML) 0.083% nebulizer solution [Carolyn Glez, KALEY - NP]     cetirizine (ZYRTEC) 10 MG tablet [KALEY Garcia - NP]    Preferred pharmacy: [id number] - [facility] [address]  - P [phone] - F [phone]

## 2024-04-29 NOTE — PROGRESS NOTES
[facility]  Hospitalist Progress Note    NAME: Mayda Rincon   :  2000   MRN:  [id number]     Total duration of encounter: 1 day      Nonbillable progress note    Admission HPI/Hospitalization Summary To Date:  23-year-old female admitted early morning  with fever chills myalgias weakness dizziness, all day sleepiness    ED evaluation: Nontoxic, tachycardic to 116, febrile to 103.  Exam otherwise reportedly unremarkable.  Slight increase in creatinine, mild hyperglycemia, brisk leukocytosis, microcytosis, anemia, normal platelet, increased monocytes urine tox negative, , flu AB COVID-negative, UA negative except 1+ bacteria, beta hCG negative, chest x-ray negative, ECG sinus tachycardia 134 slow R wave progression,  Blood/cultures sent.  Given IV Toradol Zosyn and vancomycin empirically 2 L normal saline bolus    Hospitalization: Fever and tachycardia resolved.  Leukocytosis and hyperglycemia persists    Medical history includes asthma, CAP, Rocephin allergy  Subjective:     Reason for provider encounter today:    Follow-up E/M of hospitalized patient     Chief Complaint :  Patient feels much better.  A thorough and redundant review of systems from head to toe does not reveal any other symptoms.  Her presentation of fever chills weakness myalgias have all resolved.  She has no headache stiff neck sore throat, cough congestion chest pain, abdominal pain back pain urinary symptoms.  She has no vaginal discharge bleeding, last menstrual period approximately 2 weeks ago was normal.  She denies any sexual activity at all, denies any new tattoos or exposure to anybody was ill.  Denies rashes arthralgia arthritis.  No confusion visual changes.  No exposure to anybody who is ill, no family or personal history of connective tissue disease.  She works as a  at a local hotel.    Review of Systems:    A full 10 point review was elicited from this patient which failed to indicate any

## 2024-04-29 NOTE — ED NOTES
2340 04/29/24 0000 04/29/24 0030   BP: 124/65 (!) 114/59 (!) 106/56    Pulse: (!) 121 (!) 120 (!) 118 (!) 115   Resp: 21 21 20 20   Temp:    98.4 °F (36.9 °C)   TempSrc:    Oral   SpO2: 100% 100% 98% 97%   Weight:       Height:              Pain 1: 0  Pain Scale 1: /10.    IP UNIT CALLED NOTE IS READY: Yes  IF THERE ARE QUESTIONS, CALL ED AT PHONE # [phone]

## 2024-04-29 NOTE — ACP (ADVANCE CARE PLANNING)
Advance Care Planning     General Advance Care Planning (ACP) Conversation    Date of Conversation: 4/29/2024  Conducted with: Patient with Decision Making Capacity  Other persons present: None    Healthcare Decision Maker:   Primary Decision Maker: Sandrine Rincon - [facility] - [phone]  Click here to complete Healthcare Decision Makers including selection of the Healthcare Decision Maker Relationship (ie \"Primary\").     Content/Action Overview:  Has NO ACP documents-Information provided  Reviewed DNR/DNI and patient elects Full Code (Attempt Resuscitation)    Length of Voluntary ACP Conversation in minutes:  <16 minutes (Non-Billable)    SALUD Burr

## 2024-04-29 NOTE — CARE COORDINATION
Care Management Initial Assessment       RUR: N/A OBS  Readmission? No  1st IM letter given? No N/A  1st  letter given: No N/A      04/29/24 0941   Service Assessment   Patient Orientation Alert and Oriented;Person;Place;Self;Situation   Cognition Alert   History Provided By Patient   Primary Caregiver Self   Support Systems Parent   PCP Verified by CM Yes  (Carolyn Glez NP)   Last Visit to PCP Within last year  (Last appointment 10/26/23)   Prior Functional Level Independent in ADLs/IADLs   Current Functional Level Independent in ADLs/IADLs   Can patient return to prior living arrangement Yes   Family able to assist with home care needs: Yes   Would you like for me to discuss the discharge plan with any other family members/significant others, and if so, who? Yes  (Mother Sandrine Rincon)   Financial Resources Medicaid   Community Resources None   Social/Functional History   Lives With Parent   Type of Home House   Home Equipment None   Active  Yes   Discharge Planning   Type of Residence House   Current Services Prior To Admission None   Patient expects to be discharged to: House     Ms. Rincon was admitted under Observation 4/28/24 with Septicemia. I spoke with her this morning per CM assessment.   The VOON was explained/signed/received: copy to patient, copy to chart. Also the CM introductory letter with contact information was provided.  Ms. Rincon lives in [place] with her mother Sandrine Rincon. She is independent: self manages her ADL's, does not use DME, is able to drive.  No needs are anticipated at discharge.

## 2024-04-29 NOTE — ED PROVIDER NOTES
2.0 - 4.0 g/dL    Albumin/Globulin Ratio 1.0 (L) 1.1 - 2.2     COVID-19 & Influenza Combo    Collection Time: 04/28/24 10:45 PM    Specimen: Nasopharyngeal   Result Value Ref Range    SARS-CoV-2, PCR Not detected NOTD      Rapid Influenza A By PCR Not detected NOTD      Rapid Influenza B By PCR Not detected NOTD     Lactic Acid    Collection Time: 04/28/24 10:45 PM   Result Value Ref Range    Lactic Acid, Plasma 1.6 0.4 - 2.0 MMOL/L           EKG at 10:21 PM on 4/28/2024 interpreted by me: Sinus tachycardia, 134 bpm, normal axis, normal OH, QRS, QTc intervals, no ischemic changes     RADIOLOGY:  Non-plain film images such as CT, Ultrasound and MRI are read by the radiologist. Plain radiographic images are visualized and preliminarily interpreted by the ED Provider with the below findings:     Portable chest x-ray interpreted by me: No pneumonia, no large pleural effusion, no pneumothorax     Interpretation per the Radiologist below, if available at the time of this note:     XR CHEST PORTABLE   Final Result      No acute process.                      CRITICAL CARE TIME   CRITICAL CARE NOTE :    1:27 AM    IMPENDING DETERIORATION -Cardiovascular, CNS, and Metabolic  ASSOCIATED RISK FACTORS - Dysrhythmia, Metabolic changes, and Dehydration  MANAGEMENT- Bedside Assessment and Supervision of Care  INTERPRETATION -  Xrays, ECG, and Blood Pressure  INTERVENTIONS - Metabolic interventions and fluid resuscitation  CASE REVIEW - Hospitalist/Intensivist, Nursing, Family,   TREATMENT RESPONSE -Improved  PERFORMED BY - Self    NOTES:  I have spent 45 minutes of critical care time involved in lab review, consultations with specialist, family decision- making, bedside attention and documentation. Time is exclusive of EKG interpretation, imaging interpretation and separately billed procedures.  During this entire length of time I was immediately available to the patient.  Irlanda Ruff MD     SCREENINGS   NIH Stroke Score

## 2024-04-29 NOTE — PLAN OF CARE
Problem: Pain  Goal: Verbalizes/displays adequate comfort level or baseline comfort level  Outcome: Progressing

## 2024-04-29 NOTE — ED TRIAGE NOTES
Patient states generalized weakness, chills, fever and leg pain. Patient states her symptoms started today.

## 2024-04-29 NOTE — H&P
History and Physical  Tele-Hospitalist, Tele-Medicine encounter      Patient:  Mayda Rincon    CHIEF COMPLAINT:    Fever    HISTORY OF PRESENT ILLNESS:   The patient is a 23 y.o. female, presented to emergency department with fever , chills, body aches and generalized weakness started yesterday.  She is feeling dizzy.  Denies any cough or shortness of breath  No headache or neck stiffness.  No confusion.  Denies any urinary symptoms or GI symptoms.  No skin rashes.    Past Medical History:      Diagnosis Date    Asthma     Blood type B+     Community acquired pneumonia     Eczema     Otitis media     Reactive airway disease     Vision decreased 12/15/2011    conjunctivitis & early periorbital cellulitis       Past Surgical History:  History reviewed. No pertinent surgical history.    Medications Prior to Admission:    Prior to Admission medications    Medication Sig Start Date End Date Taking? Authorizing Provider   fluticasone (FLONASE) 50 MCG/ACT nasal spray SHAKE LIQUID AND USE 2 SPRAYS IN EACH NOSTRIL DAILY 4/3/24   Carolyn Glez APRN - NP   fluticasone-salmeterol (ADVAIR DISKUS) 250-50 MCG/ACT AEPB diskus inhaler INHALE 1 PUFF BY MOUTH TWICE DAILY 4/1/24   Carolyn Glez APRN - NP   montelukast (SINGULAIR) 10 MG tablet TAKE 1 TABLET BY MOUTH EVERY NIGHT 3/8/24   Marge Britton APRN - NP   triamcinolone (KENALOG) 0.1 % ointment Apply topically 2 times daily 2/19/24   Carolyn Glez APRN - NP   ibuprofen (ADVIL;MOTRIN) 800 MG tablet Take 1 tablet by mouth 2 times daily as needed for Pain 2/19/24   Carolyn Glez APRN - NP   Nebulizers (COMPRESSOR/NEBULIZER) MISC Use as directed for nebulized medication. 1/10/24   Carolyn Glez APRN - NP   cetirizine (ZYRTEC) 10 MG tablet Take 1 tablet by mouth daily 10/26/23   Carolyn Glez APRN - NP   albuterol (PROVENTIL) (2.5 MG/3ML) 0.083% nebulizer solution USE 3 MLS VIA NEBULIZAITON EVERY 4 HOURS FOR 30 DOSES 6/12/23   Carolyn Glez APRN - NP

## 2024-04-30 ENCOUNTER — APPOINTMENT (OUTPATIENT)
Facility: HOSPITAL | Age: 24
DRG: 720 | End: 2024-04-30
Payer: MEDICAID

## 2024-04-30 LAB
ACCESSION NUMBER, LLC1M: ABNORMAL
ACINETOBACTER CALCOAC BAUMANNII COMPLEX BY PCR: NOT DETECTED
ALBUMIN SERPL-MCNC: 3.1 G/DL (ref 3.5–5)
ALBUMIN/GLOB SERPL: 0.8 (ref 1.1–2.2)
ALP SERPL-CCNC: 69 U/L (ref 45–117)
ALT SERPL-CCNC: 18 U/L (ref 12–78)
ANION GAP SERPL CALC-SCNC: 9 MMOL/L (ref 5–15)
AST SERPL-CCNC: 11 U/L (ref 15–37)
B FRAGILIS DNA BLD POS QL NAA+NON-PROBE: NOT DETECTED
BACTERIA SPEC CULT: NORMAL
BASOPHILS # BLD: 0 K/UL (ref 0–0.1)
BASOPHILS NFR BLD: 0 % (ref 0–1)
BILIRUB SERPL-MCNC: 0.4 MG/DL (ref 0.2–1)
BIOFIRE TEST COMMENT: ABNORMAL
BUN SERPL-MCNC: 5 MG/DL (ref 6–20)
BUN/CREAT SERPL: 7 (ref 12–20)
C ALBICANS DNA BLD POS QL NAA+NON-PROBE: NOT DETECTED
C AURIS DNA BLD POS QL NAA+NON-PROBE: NOT DETECTED
C GATTII+NEOFOR DNA BLD POS QL NAA+N-PRB: NOT DETECTED
C GLABRATA DNA BLD POS QL NAA+NON-PROBE: NOT DETECTED
C KRUSEI DNA BLD POS QL NAA+NON-PROBE: NOT DETECTED
C PARAP DNA BLD POS QL NAA+NON-PROBE: NOT DETECTED
C TROPICLS DNA BLD POS QL NAA+NON-PROBE: NOT DETECTED
CALCIUM SERPL-MCNC: 8.6 MG/DL (ref 8.5–10.1)
CHLORIDE SERPL-SCNC: 104 MMOL/L (ref 97–108)
CO2 SERPL-SCNC: 24 MMOL/L (ref 21–32)
CREAT SERPL-MCNC: 0.73 MG/DL (ref 0.55–1.02)
CRP SERPL-MCNC: 3.82 MG/DL (ref 0–0.3)
DIFFERENTIAL METHOD BLD: ABNORMAL
E CLOAC COMP DNA BLD POS NAA+NON-PROBE: NOT DETECTED
E COLI DNA BLD POS QL NAA+NON-PROBE: NOT DETECTED
E FAECALIS DNA BLD POS QL NAA+NON-PROBE: NOT DETECTED
E FAECIUM DNA BLD POS QL NAA+NON-PROBE: NOT DETECTED
ENTEROBACTERALES DNA BLD POS NAA+N-PRB: NOT DETECTED
EOSINOPHIL # BLD: 0.8 K/UL (ref 0–0.4)
EOSINOPHIL NFR BLD: 5 % (ref 0–7)
ERYTHROCYTE [DISTWIDTH] IN BLOOD BY AUTOMATED COUNT: 15.2 % (ref 11.5–14.5)
ERYTHROCYTE [SEDIMENTATION RATE] IN BLOOD: 33 MM/HR (ref 0–20)
GLOBULIN SER CALC-MCNC: 3.9 G/DL (ref 2–4)
GLUCOSE SERPL-MCNC: 99 MG/DL (ref 65–100)
GP B STREP DNA BLD POS QL NAA+NON-PROBE: NOT DETECTED
HAEM INFLU DNA BLD POS QL NAA+NON-PROBE: NOT DETECTED
HCT VFR BLD AUTO: 30.6 % (ref 35–47)
HGB BLD-MCNC: 9.5 G/DL (ref 11.5–16)
IMM GRANULOCYTES # BLD AUTO: 0 K/UL (ref 0–0.04)
IMM GRANULOCYTES NFR BLD AUTO: 0 % (ref 0–0.5)
IRON SATN MFR SERPL: 2 % (ref 20–50)
IRON SERPL-MCNC: 8 UG/DL (ref 50–170)
K OXYTOCA DNA BLD POS QL NAA+NON-PROBE: NOT DETECTED
KLEBSIELLA SP DNA BLD POS QL NAA+NON-PRB: NOT DETECTED
KLEBSIELLA SP DNA BLD POS QL NAA+NON-PRB: NOT DETECTED
L MONOCYTOG DNA BLD POS QL NAA+NON-PROBE: NOT DETECTED
LYMPHOCYTES # BLD: 2 K/UL (ref 0.8–3.5)
LYMPHOCYTES NFR BLD: 13 % (ref 12–49)
MCH RBC QN AUTO: 22.9 PG (ref 26–34)
MCHC RBC AUTO-ENTMCNC: 31 G/DL (ref 30–36.5)
MCV RBC AUTO: 73.9 FL (ref 80–99)
MECA+MECC ISLT/SPM QL: NOT DETECTED
MONOCYTES # BLD: 1.8 K/UL (ref 0–1)
MONOCYTES NFR BLD: 12 % (ref 5–13)
N MEN DNA BLD POS QL NAA+NON-PROBE: NOT DETECTED
NEUTS SEG # BLD: 10.5 K/UL (ref 1.8–8)
NEUTS SEG NFR BLD: 70 % (ref 32–75)
NRBC # BLD: 0 K/UL (ref 0–0.01)
NRBC BLD-RTO: 0 PER 100 WBC
P AERUGINOSA DNA BLD POS NAA+NON-PROBE: NOT DETECTED
PLATELET # BLD AUTO: 291 K/UL (ref 150–400)
PMV BLD AUTO: 9.9 FL (ref 8.9–12.9)
POTASSIUM SERPL-SCNC: 4.1 MMOL/L (ref 3.5–5.1)
PROT SERPL-MCNC: 7 G/DL (ref 6.4–8.2)
PROTEUS SP DNA BLD POS QL NAA+NON-PROBE: NOT DETECTED
RBC # BLD AUTO: 4.14 M/UL (ref 3.8–5.2)
RESISTANT GENE TARGETS: ABNORMAL
S AUREUS DNA BLD POS QL NAA+NON-PROBE: NOT DETECTED
S AUREUS+CONS DNA BLD POS NAA+NON-PROBE: DETECTED
S EPIDERMIDIS DNA BLD POS QL NAA+NON-PRB: DETECTED
S LUGDUNENSIS DNA BLD POS QL NAA+NON-PRB: NOT DETECTED
S MALTOPHILIA DNA BLD POS QL NAA+NON-PRB: NOT DETECTED
S MARCESCENS DNA BLD POS NAA+NON-PROBE: NOT DETECTED
S PNEUM DNA BLD POS QL NAA+NON-PROBE: NOT DETECTED
S PYO DNA BLD POS QL NAA+NON-PROBE: NOT DETECTED
SALMONELLA DNA BLD POS QL NAA+NON-PROBE: NOT DETECTED
SERVICE CMNT-IMP: NORMAL
SODIUM SERPL-SCNC: 137 MMOL/L (ref 136–145)
STREPTOCOCCUS DNA BLD POS NAA+NON-PROBE: NOT DETECTED
TIBC SERPL-MCNC: 398 UG/DL (ref 250–450)
WBC # BLD AUTO: 15.2 K/UL (ref 3.6–11)

## 2024-04-30 PROCEDURE — 1100000000 HC RM PRIVATE

## 2024-04-30 PROCEDURE — 80053 COMPREHEN METABOLIC PANEL: CPT

## 2024-04-30 PROCEDURE — 71260 CT THORAX DX C+: CPT

## 2024-04-30 PROCEDURE — 6360000002 HC RX W HCPCS: Performed by: INTERNAL MEDICINE

## 2024-04-30 PROCEDURE — 85652 RBC SED RATE AUTOMATED: CPT

## 2024-04-30 PROCEDURE — 36415 COLL VENOUS BLD VENIPUNCTURE: CPT

## 2024-04-30 PROCEDURE — 85025 COMPLETE CBC W/AUTO DIFF WBC: CPT

## 2024-04-30 PROCEDURE — G0378 HOSPITAL OBSERVATION PER HR: HCPCS

## 2024-04-30 PROCEDURE — 6370000000 HC RX 637 (ALT 250 FOR IP): Performed by: INTERNAL MEDICINE

## 2024-04-30 PROCEDURE — 86617 LYME DISEASE ANTIBODY: CPT

## 2024-04-30 PROCEDURE — 2580000003 HC RX 258: Performed by: INTERNAL MEDICINE

## 2024-04-30 PROCEDURE — 86757 RICKETTSIA ANTIBODY: CPT

## 2024-04-30 PROCEDURE — 87040 BLOOD CULTURE FOR BACTERIA: CPT

## 2024-04-30 PROCEDURE — 94761 N-INVAS EAR/PLS OXIMETRY MLT: CPT

## 2024-04-30 PROCEDURE — 94640 AIRWAY INHALATION TREATMENT: CPT

## 2024-04-30 PROCEDURE — 86140 C-REACTIVE PROTEIN: CPT

## 2024-04-30 PROCEDURE — 86666 EHRLICHIA ANTIBODY: CPT

## 2024-04-30 PROCEDURE — 2500000003 HC RX 250 WO HCPCS: Performed by: INTERNAL MEDICINE

## 2024-04-30 PROCEDURE — 6360000004 HC RX CONTRAST MEDICATION: Performed by: INTERNAL MEDICINE

## 2024-04-30 RX ORDER — ALBUTEROL SULFATE 2.5 MG/3ML
2.5 SOLUTION RESPIRATORY (INHALATION) EVERY 4 HOURS PRN
Qty: 3 ML | Refills: 5 | Status: SHIPPED | OUTPATIENT
Start: 2024-04-30

## 2024-04-30 RX ORDER — FERROUS SULFATE 324(65)MG
324 TABLET, DELAYED RELEASE (ENTERIC COATED) ORAL
Status: DISCONTINUED | OUTPATIENT
Start: 2024-04-30 | End: 2024-05-02 | Stop reason: HOSPADM

## 2024-04-30 RX ORDER — TRIAMCINOLONE ACETONIDE 1 MG/G
1 OINTMENT TOPICAL 2 TIMES DAILY
Status: ON HOLD | COMMUNITY
End: 2024-05-02 | Stop reason: HOSPADM

## 2024-04-30 RX ORDER — LACTOBACILLUS RHAMNOSUS GG 10B CELL
1 CAPSULE ORAL
Status: DISCONTINUED | OUTPATIENT
Start: 2024-05-01 | End: 2024-05-02 | Stop reason: HOSPADM

## 2024-04-30 RX ORDER — CETIRIZINE HYDROCHLORIDE 10 MG/1
10 TABLET ORAL DAILY
Qty: 90 TABLET | Refills: 1 | Status: SHIPPED | OUTPATIENT
Start: 2024-04-30

## 2024-04-30 RX ADMIN — MONTELUKAST 10 MG: 10 TABLET, FILM COATED ORAL at 20:20

## 2024-04-30 RX ADMIN — MOMETASONE FUROATE AND FORMOTEROL FUMARATE DIHYDRATE 2 PUFF: 100; 5 AEROSOL RESPIRATORY (INHALATION) at 19:24

## 2024-04-30 RX ADMIN — MOMETASONE FUROATE AND FORMOTEROL FUMARATE DIHYDRATE 2 PUFF: 100; 5 AEROSOL RESPIRATORY (INHALATION) at 08:06

## 2024-04-30 RX ADMIN — SODIUM CHLORIDE: 9 INJECTION, SOLUTION INTRAVENOUS at 12:31

## 2024-04-30 RX ADMIN — IOPAMIDOL 100 ML: 755 INJECTION, SOLUTION INTRAVENOUS at 14:24

## 2024-04-30 RX ADMIN — DOXYCYCLINE 100 MG: 100 INJECTION, POWDER, LYOPHILIZED, FOR SOLUTION INTRAVENOUS at 13:08

## 2024-04-30 RX ADMIN — PIPERACILLIN AND TAZOBACTAM 3375 MG: 3; .375 INJECTION, POWDER, LYOPHILIZED, FOR SOLUTION INTRAVENOUS at 18:39

## 2024-04-30 RX ADMIN — SODIUM CHLORIDE 1500 MG: 9 INJECTION, SOLUTION INTRAVENOUS at 15:00

## 2024-04-30 RX ADMIN — PIPERACILLIN AND TAZOBACTAM 4500 MG: 4; .5 INJECTION, POWDER, LYOPHILIZED, FOR SOLUTION INTRAVENOUS at 12:35

## 2024-04-30 RX ADMIN — CETIRIZINE HYDROCHLORIDE 10 MG: 10 TABLET, FILM COATED ORAL at 08:18

## 2024-04-30 RX ADMIN — FERROUS SULFATE TAB EC 324 MG (65 MG FE EQUIVALENT) 324 MG: 324 (65 FE) TABLET DELAYED RESPONSE at 09:32

## 2024-04-30 ASSESSMENT — PAIN SCALES - GENERAL: PAINLEVEL_OUTOF10: 0

## 2024-04-30 NOTE — PROGRESS NOTES
ID cross coverage  Chart reviewed at length with Dr. Akbar who requested ID input.  The patient is a 23 y.o. female, presented to emergency department with fever , chills, body aches and generalized weakness started yesterday.  And feeling dizzy  No history of cough or shortness of breath.  No surgery or any procedures, no tick bite, no sick contacts.  No headache or neck stiffness.  No confusion.  Denies any urinary symptoms or GI symptoms.  Patient has been admitted to hospitalist service and had extensive workup done.  Persistent fevers Tmax 103 on 4/28, 99 today  RVP and COVID panel negative, mononucleosis screen negative blood cultures+ 1/4  HIV negative    Recommendation  Vancomycin and Zosyn IV  Repeat blood cultures x 2  CT chest abdomen pelvis  EBV IgM  Tick bite panel-Rickettsia, Ehrlichia, Lyme  Call ID if any new results come in.

## 2024-04-30 NOTE — PLAN OF CARE
Problem: Respiratory - Adult  Goal: Achieves optimal ventilation and oxygenation  4/30/2024 1230 by Magalis Mcallister, RT  Outcome: Progressing  4/30/2024 0824 by Amarilis Cuellar RN  Outcome: Progressing

## 2024-04-30 NOTE — SIGNIFICANT EVENT
Had an Discussion with ID consult whose help is greatly appreciated.  She will place a formal consult.  At this point agreeing with current management with the addition of now resuming vancomycin/Zosyn, adding doxycycline for potential tickborne illness, probiotic and IV fluids.  CT of the chest and abdomen with contrast.    Patient does have a reported allergy to Rocephin, but did tolerate Zosyn fine in the ED.  Additionally, the patient has had amoxicillin on this 1 or 2 occasions in the last year without problems.    Etiology of her GPC bacteremia is unclear.  This is most likely contaminant but until then we will be more aggressive with management as per ID recommendations    I did discuss above with the mother.  Indicated if patient develops any additional or concerning symptomatology with them need to pursue additional studies such as LP.

## 2024-04-30 NOTE — PROGRESS NOTES
Pharmacy Note - Zosyn    Zosyn 3.375 gm IVPV q 6 h (30 min infusion) ordered for treatment of Bloodstream Infection . Per [facility] Policy, Zosyn will be changed to 4.5 gm IVPB x 1 to be followed by Zosyn 3.375 gm IVPB q 8 h  (4 hr infusion).     Had Zosyn 4.5 gm @ 0009 on 4/29, re-bolus indicated    Estimated Creatinine Clearance: Estimated Creatinine Clearance: 128 mL/min (based on SCr of 0.73 mg/dL).  Dialysis Status, MARIAELENA, CKD: n/a    BMI:  Body mass index is 25.69 kg/m².    Rationale for Adjustment:  [facility] B-Lactam extended infusion policy    Pharmacy will continue to monitor and adjust dose as necessary.      Please call inpatient pharmacy with any questions.    Thank you,  LA NENA TORRES, [facility] MS

## 2024-04-30 NOTE — PLAN OF CARE
Problem: Pain  Goal: Verbalizes/displays adequate comfort level or baseline comfort level  Outcome: Progressing     Problem: Respiratory - Adult  Goal: Achieves optimal ventilation and oxygenation  Outcome: Progressing      Patient was not available for their therapy session at this time.  Reason not seen: Nursing with patient (09/20/17 0851).  Prior to attempt, pt with stroke NP for assessment.  Re-Attempt Plan: Will re-attempt per established treatment plan (09/20/17 0851).

## 2024-04-30 NOTE — SIGNIFICANT EVENT
Review of CT results reveals very prominent right upper lobe infiltrate consistent with pneumonia.  This is unexpected given initial chest x-ray reading and the prominence of the infiltrate on CT.  Review of the chest x-ray however may reveal infiltrate right upper lobe.  Cough was not a prominent component of her symptom complex but she does have asthma and has had pneumonia in the past.  The underlying asthma may predispose to infection.  The rapidity of symptom onset may suggest streptococcal pneumonia and I would not be surprised if GPC blood cultures were positive for strep.  I did inform the patient and the mother of this diagnosis.    Plan: In addition to previously described, will add urine Legionella, nares MRSA and await blood cultures.  Appreciate ID's help again.  Continue current regimen until species identified

## 2024-04-30 NOTE — PROGRESS NOTES
[facility]  Hospitalist Progress Note    NAME: Mayda Rincon   :  2000   MRN:  [id number]     Total duration of encounter: 2 days    Admission HPI/Hospitalization Summary To Date:  Admission HPI/Hospitalization Summary To Date:  23-year-old female admitted early morning  with fever chills myalgias weakness dizziness, all day sleepiness     ED evaluation: Was moderately ill-appearing, tachycardic to 116, febrile to 103.  Exam otherwise reportedly unremarkable for obvious infectious site.  Slight increase in creatinine, mild hyperglycemia, brisk leukocytosis, microcytosis, anemia, normal platelet, increased monocytes urine tox negative, , flu AB COVID-negative, UA negative except 1+ bacteria, beta hCG negative, chest x-ray negative, ECG sinus tachycardia 134 slow R wave progression,  Blood/cultures sent.  Given IV Toradol Zosyn and vancomycin empirically 2 L normal saline bolus     Hospitalization: Patient remained hemodynamically stable, temperature curve normalized.  She received a total of 1 dose of Zosyn and vancomycin.  With quick resolution of her symptoms antibiotics were quickly discontinued.  Despite in-depth discussion and examination, there was no clinical evidence for source of infection, and her constitutional symptoms resolved.  She had in particular no neck stiffness, no pelvic pain vaginal discharge no persistent arthralgias or myalgias, nor rash.  She denies IVDA, tick bites or exposure to anybody was ill.  She does work however at a local hotel.  Her white count remains elevated though improved today.  Blood culture now 1/2 positive GPC.    Her abnormal CBC was evaluated in detail consistent with iron deficiency, pathology smear review showed no evidence for malignancy.  She started on iron.         Subjective:     Reason for provider encounter today:    Follow-up E/M of hospitalized patient     Chief Complaint :  No particular complaints wishes to go home    Review

## 2024-04-30 NOTE — CARE COORDINATION
Transition of Care Plan:    RUR: n/a obs   Prior Level of Functioning: Independent   Disposition: Home when medically stable.   If SNF or IPR: Date FOC offered:   Date FOC received:   Accepting facility:   Date authorization started with reference number:   Date authorization received and expires:   Follow up appointments:   DME needed:   Transportation at discharge:   IM/IMM Medicare/ letter given:   Is patient a [place] and connected with [facility]?    If yes, was [place] transfer form completed and [facility] notified?   Caregiver Contact:   Discharge Caregiver contacted prior to discharge?   Care Conference needed?   Barriers to discharge: Medical instability       1048: Asked by [facility] manager to speak to patient and her mother. Mother wanted to know what was going on regarding discharge. Told her that per MD, wanted to keep patient at least another day and do another blood culture. She had other medical questions that would be best answered by hospitalist. CM called hospitalist and made him aware.

## 2024-04-30 NOTE — PROGRESS NOTES
Pharmacy Medication Reconciliation     The patient was interviewed regarding current PTA medication list, use and drug allergies.        Clarified PTA med list with Pt.      The patient was questioned regarding use of any:  inhalers, topical products, over the counter medications, herbal medications, vitamin products or ophthalmic/nasal/otic medication use.   Allergies were verified.        Allergy Update: ceftriaxone     Recommendations/Findings:   The following amendments were made to the patient's active medication list on file at [facility]:   1) Additions: none    2) Deletions: John Mcadams    3) Changes: none    Counseling provided includes side effects/adverse drug reaction: y    Drug Interactions and duplicate therapies include: none    PTA medication list was corrected to the following:     Prior to Admission Medications   Prescriptions Last Dose Informant Patient Reported? Taking?   albuterol (PROVENTIL) (2.5 MG/3ML) 0.083% nebulizer solution Past Week  No No   Sig: USE 3 MLS VIA NEBULIZAITON EVERY 4 HOURS FOR 30 DOSES   Patient taking differently: Take 3 mLs by nebulization every 4 hours as needed for Wheezing or Shortness of Breath   cetirizine (ZYRTEC) 10 MG tablet 4/29/2024  No No   Sig: Take 1 tablet by mouth daily   fluticasone (FLONASE) 50 MCG/ACT nasal spray 4/29/2024  No No   Sig: SHAKE LIQUID AND USE 2 SPRAYS IN EACH NOSTRIL DAILY   fluticasone-salmeterol (ADVAIR DISKUS) 250-50 MCG/ACT AEPB diskus inhaler 4/29/2024  No No   Sig: INHALE 1 PUFF BY MOUTH TWICE DAILY   ibuprofen (ADVIL;MOTRIN) 800 MG tablet Past Week  No No   Sig: Take 1 tablet by mouth 2 times daily as needed for Pain   triamcinolone (KENALOG) 0.1 % ointment Past Week  Yes Yes   Sig: Apply 1 g topically 2 times daily Apply topically 2 times daily.      Facility-Administered Medications: None        Thank you,  Jacky Damon [facility]

## 2024-05-01 PROBLEM — D50.0 IRON DEFICIENCY ANEMIA DUE TO CHRONIC BLOOD LOSS: Status: ACTIVE | Noted: 2024-05-01

## 2024-05-01 LAB
ACCESSION NUMBER, LLC1M: ABNORMAL
ACINETOBACTER CALCOAC BAUMANNII COMPLEX BY PCR: NOT DETECTED
ALBUMIN SERPL-MCNC: 3 G/DL (ref 3.5–5)
ALBUMIN/GLOB SERPL: 0.7 (ref 1.1–2.2)
ALP SERPL-CCNC: 74 U/L (ref 45–117)
ALT SERPL-CCNC: 21 U/L (ref 12–78)
ANION GAP SERPL CALC-SCNC: 9 MMOL/L (ref 5–15)
AST SERPL-CCNC: 13 U/L (ref 15–37)
B FRAGILIS DNA BLD POS QL NAA+NON-PROBE: NOT DETECTED
BACTERIA SPEC CULT: ABNORMAL
BACTERIA SPEC CULT: ABNORMAL
BACTERIA SPEC CULT: NORMAL
BACTERIA SPEC CULT: NORMAL
BASOPHILS # BLD: 0 K/UL (ref 0–0.1)
BASOPHILS NFR BLD: 0 % (ref 0–1)
BILIRUB SERPL-MCNC: 0.3 MG/DL (ref 0.2–1)
BIOFIRE TEST COMMENT: ABNORMAL
BUN SERPL-MCNC: 8 MG/DL (ref 6–20)
BUN/CREAT SERPL: 10 (ref 12–20)
C ALBICANS DNA BLD POS QL NAA+NON-PROBE: NOT DETECTED
C AURIS DNA BLD POS QL NAA+NON-PROBE: NOT DETECTED
C GATTII+NEOFOR DNA BLD POS QL NAA+N-PRB: NOT DETECTED
C GLABRATA DNA BLD POS QL NAA+NON-PROBE: NOT DETECTED
C KRUSEI DNA BLD POS QL NAA+NON-PROBE: NOT DETECTED
C PARAP DNA BLD POS QL NAA+NON-PROBE: NOT DETECTED
C TROPICLS DNA BLD POS QL NAA+NON-PROBE: NOT DETECTED
CALCIUM SERPL-MCNC: 8.7 MG/DL (ref 8.5–10.1)
CHLORIDE SERPL-SCNC: 104 MMOL/L (ref 97–108)
CO2 SERPL-SCNC: 25 MMOL/L (ref 21–32)
CREAT SERPL-MCNC: 0.78 MG/DL (ref 0.55–1.02)
DIFFERENTIAL METHOD BLD: ABNORMAL
E CLOAC COMP DNA BLD POS NAA+NON-PROBE: NOT DETECTED
E COLI DNA BLD POS QL NAA+NON-PROBE: NOT DETECTED
E FAECALIS DNA BLD POS QL NAA+NON-PROBE: NOT DETECTED
E FAECIUM DNA BLD POS QL NAA+NON-PROBE: NOT DETECTED
EKG ATRIAL RATE: 134 BPM
EKG DIAGNOSIS: NORMAL
EKG P AXIS: 67 DEGREES
EKG P-R INTERVAL: 152 MS
EKG Q-T INTERVAL: 280 MS
EKG QRS DURATION: 60 MS
EKG QTC CALCULATION (BAZETT): 418 MS
EKG R AXIS: 30 DEGREES
EKG T AXIS: 37 DEGREES
EKG VENTRICULAR RATE: 134 BPM
ENTEROBACTERALES DNA BLD POS NAA+N-PRB: NOT DETECTED
EOSINOPHIL # BLD: 0.7 K/UL (ref 0–0.4)
EOSINOPHIL NFR BLD: 6 % (ref 0–7)
ERYTHROCYTE [DISTWIDTH] IN BLOOD BY AUTOMATED COUNT: 15.1 % (ref 11.5–14.5)
GLOBULIN SER CALC-MCNC: 4.1 G/DL (ref 2–4)
GLUCOSE SERPL-MCNC: 104 MG/DL (ref 65–100)
GP B STREP DNA BLD POS QL NAA+NON-PROBE: NOT DETECTED
HAEM INFLU DNA BLD POS QL NAA+NON-PROBE: NOT DETECTED
HCT VFR BLD AUTO: 30.4 % (ref 35–47)
HGB BLD-MCNC: 9.5 G/DL (ref 11.5–16)
IMM GRANULOCYTES # BLD AUTO: 0 K/UL (ref 0–0.04)
IMM GRANULOCYTES NFR BLD AUTO: 0 % (ref 0–0.5)
K OXYTOCA DNA BLD POS QL NAA+NON-PROBE: NOT DETECTED
KLEBSIELLA SP DNA BLD POS QL NAA+NON-PRB: NOT DETECTED
KLEBSIELLA SP DNA BLD POS QL NAA+NON-PRB: NOT DETECTED
L MONOCYTOG DNA BLD POS QL NAA+NON-PROBE: NOT DETECTED
LYMPHOCYTES # BLD: 1.3 K/UL (ref 0.8–3.5)
LYMPHOCYTES NFR BLD: 12 % (ref 12–49)
MCH RBC QN AUTO: 22.8 PG (ref 26–34)
MCHC RBC AUTO-ENTMCNC: 31.3 G/DL (ref 30–36.5)
MCV RBC AUTO: 73.1 FL (ref 80–99)
MECA+MECC ISLT/SPM QL: NOT DETECTED
MONOCYTES # BLD: 1.1 K/UL (ref 0–1)
MONOCYTES NFR BLD: 10 % (ref 5–13)
N MEN DNA BLD POS QL NAA+NON-PROBE: NOT DETECTED
NEUTS SEG # BLD: 7.9 K/UL (ref 1.8–8)
NEUTS SEG NFR BLD: 72 % (ref 32–75)
NRBC # BLD: 0 K/UL (ref 0–0.01)
NRBC BLD-RTO: 0 PER 100 WBC
P AERUGINOSA DNA BLD POS NAA+NON-PROBE: NOT DETECTED
PLATELET # BLD AUTO: 313 K/UL (ref 150–400)
PMV BLD AUTO: 9.7 FL (ref 8.9–12.9)
POTASSIUM SERPL-SCNC: 4 MMOL/L (ref 3.5–5.1)
PROT SERPL-MCNC: 7.1 G/DL (ref 6.4–8.2)
PROTEUS SP DNA BLD POS QL NAA+NON-PROBE: NOT DETECTED
RBC # BLD AUTO: 4.16 M/UL (ref 3.8–5.2)
RESISTANT GENE TARGETS: ABNORMAL
S AUREUS DNA BLD POS QL NAA+NON-PROBE: NOT DETECTED
S AUREUS+CONS DNA BLD POS NAA+NON-PROBE: DETECTED
S EPIDERMIDIS DNA BLD POS QL NAA+NON-PRB: DETECTED
S LUGDUNENSIS DNA BLD POS QL NAA+NON-PRB: NOT DETECTED
S MALTOPHILIA DNA BLD POS QL NAA+NON-PRB: NOT DETECTED
S MARCESCENS DNA BLD POS NAA+NON-PROBE: NOT DETECTED
S PNEUM DNA BLD POS QL NAA+NON-PROBE: NOT DETECTED
S PYO DNA BLD POS QL NAA+NON-PROBE: NOT DETECTED
SALMONELLA DNA BLD POS QL NAA+NON-PROBE: NOT DETECTED
SERVICE CMNT-IMP: ABNORMAL
SERVICE CMNT-IMP: NORMAL
SODIUM SERPL-SCNC: 138 MMOL/L (ref 136–145)
STREPTOCOCCUS DNA BLD POS NAA+NON-PROBE: NOT DETECTED
WBC # BLD AUTO: 11 K/UL (ref 3.6–11)

## 2024-05-01 PROCEDURE — 6370000000 HC RX 637 (ALT 250 FOR IP): Performed by: INTERNAL MEDICINE

## 2024-05-01 PROCEDURE — 87449 NOS EACH ORGANISM AG IA: CPT

## 2024-05-01 PROCEDURE — 2500000003 HC RX 250 WO HCPCS: Performed by: INTERNAL MEDICINE

## 2024-05-01 PROCEDURE — 94640 AIRWAY INHALATION TREATMENT: CPT

## 2024-05-01 PROCEDURE — 1100000000 HC RM PRIVATE

## 2024-05-01 PROCEDURE — 85025 COMPLETE CBC W/AUTO DIFF WBC: CPT

## 2024-05-01 PROCEDURE — 2580000003 HC RX 258: Performed by: INTERNAL MEDICINE

## 2024-05-01 PROCEDURE — 36415 COLL VENOUS BLD VENIPUNCTURE: CPT

## 2024-05-01 PROCEDURE — 6360000002 HC RX W HCPCS: Performed by: INTERNAL MEDICINE

## 2024-05-01 PROCEDURE — 80053 COMPREHEN METABOLIC PANEL: CPT

## 2024-05-01 RX ORDER — DOXYCYCLINE 100 MG/1
100 CAPSULE ORAL EVERY 12 HOURS SCHEDULED
Status: DISCONTINUED | OUTPATIENT
Start: 2024-05-01 | End: 2024-05-02 | Stop reason: HOSPADM

## 2024-05-01 RX ADMIN — DOXYCYCLINE 100 MG: 100 INJECTION, POWDER, LYOPHILIZED, FOR SOLUTION INTRAVENOUS at 01:16

## 2024-05-01 RX ADMIN — MOMETASONE FUROATE AND FORMOTEROL FUMARATE DIHYDRATE 2 PUFF: 100; 5 AEROSOL RESPIRATORY (INHALATION) at 08:20

## 2024-05-01 RX ADMIN — DOXYCYCLINE 100 MG: 100 CAPSULE ORAL at 11:15

## 2024-05-01 RX ADMIN — MONTELUKAST 10 MG: 10 TABLET, FILM COATED ORAL at 21:42

## 2024-05-01 RX ADMIN — Medication 1 CAPSULE: at 08:15

## 2024-05-01 RX ADMIN — SODIUM CHLORIDE, PRESERVATIVE FREE 10 ML: 5 INJECTION INTRAVENOUS at 08:16

## 2024-05-01 RX ADMIN — CETIRIZINE HYDROCHLORIDE 10 MG: 10 TABLET, FILM COATED ORAL at 08:15

## 2024-05-01 RX ADMIN — PIPERACILLIN AND TAZOBACTAM 3375 MG: 3; .375 INJECTION, POWDER, LYOPHILIZED, FOR SOLUTION INTRAVENOUS at 04:11

## 2024-05-01 RX ADMIN — DOXYCYCLINE 100 MG: 100 CAPSULE ORAL at 21:42

## 2024-05-01 RX ADMIN — SODIUM CHLORIDE 1500 MG: 9 INJECTION, SOLUTION INTRAVENOUS at 02:34

## 2024-05-01 RX ADMIN — FERROUS SULFATE TAB EC 324 MG (65 MG FE EQUIVALENT) 324 MG: 324 (65 FE) TABLET DELAYED RESPONSE at 08:15

## 2024-05-01 RX ADMIN — SODIUM CHLORIDE, PRESERVATIVE FREE 10 ML: 5 INJECTION INTRAVENOUS at 21:43

## 2024-05-01 RX ADMIN — MOMETASONE FUROATE AND FORMOTEROL FUMARATE DIHYDRATE 2 PUFF: 100; 5 AEROSOL RESPIRATORY (INHALATION) at 19:40

## 2024-05-01 ASSESSMENT — PAIN SCALES - GENERAL: PAINLEVEL_OUTOF10: 0

## 2024-05-01 NOTE — PLAN OF CARE
Problem: Pain  Goal: Verbalizes/displays adequate comfort level or baseline comfort level  4/30/2024 2150 by Isabel Schultz, RN  Outcome: Progressing  4/30/2024 0824 by Amarilis Cuellar, RN  Outcome: Progressing

## 2024-05-01 NOTE — PROGRESS NOTES
[facility]  Hospitalist Progress Note    NAME: Mayda Rincon   :  2000   MRN:  [id number]     Total duration of encounter: 3 days      Interim Hospital Summary: 23 y.o. female who presented on 2024 with Sepsis (HCC). She has a past medical history of Asthma, Blood type B+, Community acquired pneumonia, Eczema, Otitis media, Reactive airway disease, and Vision decreased..         Pt admitted with sudden fever, chills, myalgia, and weakness x 24hrs  Feeling dizzy.  Denied cough or SOB  No HA or neck stiffness, no alteration of mentation  No GI or  symptoms    Improved on treatment, feels ready for d/c  Notes one ill co-worker exposure PTA     Subjective:     Chief Complaint / Reason for Physician Visit  \"Better, can I go home\".  Discussed with RN   No fever since day of admission  No SOB or wheeze  Strength no    No skin problems / pimples / boils     Review of Systems:  Symptom Y/N Comments  Symptom Y/N Comments   Fever/Chills n   Chest Pain n    Poor Appetite n   Edema n    Cough n   Abdominal Pain n    Sputum n   Joint Pain n    SOB/CLARK n   Pruritis/Rash n    Nausea/vomit n   Tolerating PT/OT     Diarrhea n   Tolerating Diet y    Constipation n   Other         Current Facility-Administered Medications:     doxycycline monohydrate (MONODOX) capsule 100 mg, 100 mg, Oral, 2 times per day, Clifford Wise MD, 100 mg at 24 1115    ferrous sulfate EC tablet 324 mg, 324 mg, Oral, Daily with breakfast, Clyde Storey MD, 324 mg at 24 0815    lactobacillus (CULTURELLE) capsule 1 capsule, 1 capsule, Oral, Daily with breakfast, Marci Moreland MD, 1 capsule at 24 0815    albuterol (PROVENTIL) (2.5 MG/3ML) 0.083% nebulizer solution 2.5 mg, 2.5 mg, Nebulization, Q4H PRN, Charlotte Soliman MD    cetirizine (ZYRTEC) tablet 10 mg, 10 mg, Oral, Daily, Charlotte Soliman MD, 10 mg at 24 0815    montelukast (SINGULAIR) tablet 10 mg, 10 mg, Oral, Nightly, Charlotte Soliman MD, 10

## 2024-05-01 NOTE — PLAN OF CARE
Problem: Pain  Goal: Verbalizes/displays adequate comfort level or baseline comfort level  5/1/2024 0756 by Melly Garcia, RN  Outcome: Progressing  4/30/2024 2150 by Isabel Schultz, RN  Outcome: Progressing     Problem: Respiratory - Adult  Goal: Achieves optimal ventilation and oxygenation  Outcome: Progressing

## 2024-05-02 ENCOUNTER — PATIENT MESSAGE (OUTPATIENT)
Age: 24
End: 2024-05-02

## 2024-05-02 VITALS
WEIGHT: 164 LBS | HEART RATE: 90 BPM | RESPIRATION RATE: 16 BRPM | TEMPERATURE: 97.9 F | HEIGHT: 67 IN | SYSTOLIC BLOOD PRESSURE: 124 MMHG | BODY MASS INDEX: 25.74 KG/M2 | OXYGEN SATURATION: 98 % | DIASTOLIC BLOOD PRESSURE: 71 MMHG

## 2024-05-02 DIAGNOSIS — J45.40 MODERATE PERSISTENT ASTHMA WITHOUT COMPLICATION: ICD-10-CM

## 2024-05-02 LAB
ANION GAP SERPL CALC-SCNC: 10 MMOL/L (ref 5–15)
B BURGDOR IGG PATRN SER IB-IMP: NEGATIVE
B BURGDOR IGM PATRN SER IB-IMP: NEGATIVE
B BURGDOR18KD IGG SER QL IB: ABNORMAL
B BURGDOR23KD IGG SER QL IB: ABNORMAL
B BURGDOR23KD IGM SER QL IB: ABNORMAL
B BURGDOR28KD IGG SER QL IB: ABNORMAL
B BURGDOR30KD IGG SER QL IB: ABNORMAL
B BURGDOR39KD IGG SER QL IB: ABNORMAL
B BURGDOR39KD IGM SER QL IB: PRESENT
B BURGDOR41KD IGG SER QL IB: PRESENT
B BURGDOR41KD IGM SER QL IB: ABNORMAL
B BURGDOR45KD IGG SER QL IB: ABNORMAL
B BURGDOR58KD IGG SER QL IB: ABNORMAL
B BURGDOR66KD IGG SER QL IB: ABNORMAL
B BURGDOR93KD IGG SER QL IB: ABNORMAL
BASOPHILS # BLD: 0 K/UL (ref 0–0.1)
BASOPHILS NFR BLD: 0 % (ref 0–1)
BUN SERPL-MCNC: 7 MG/DL (ref 6–20)
BUN/CREAT SERPL: 9 (ref 12–20)
CALCIUM SERPL-MCNC: 9.4 MG/DL (ref 8.5–10.1)
CHLORIDE SERPL-SCNC: 102 MMOL/L (ref 97–108)
CO2 SERPL-SCNC: 27 MMOL/L (ref 21–32)
CREAT SERPL-MCNC: 0.79 MG/DL (ref 0.55–1.02)
DIFFERENTIAL METHOD BLD: ABNORMAL
EOSINOPHIL # BLD: 0.6 K/UL (ref 0–0.4)
EOSINOPHIL NFR BLD: 7 % (ref 0–7)
ERYTHROCYTE [DISTWIDTH] IN BLOOD BY AUTOMATED COUNT: 14.9 % (ref 11.5–14.5)
GLUCOSE SERPL-MCNC: 100 MG/DL (ref 65–100)
HCT VFR BLD AUTO: 33.7 % (ref 35–47)
HGB BLD-MCNC: 10.2 G/DL (ref 11.5–16)
IMM GRANULOCYTES # BLD AUTO: 0 K/UL (ref 0–0.04)
IMM GRANULOCYTES NFR BLD AUTO: 0 % (ref 0–0.5)
LYMPHOCYTES # BLD: 2 K/UL (ref 0.8–3.5)
LYMPHOCYTES NFR BLD: 22 % (ref 12–49)
MCH RBC QN AUTO: 22.7 PG (ref 26–34)
MCHC RBC AUTO-ENTMCNC: 30.3 G/DL (ref 30–36.5)
MCV RBC AUTO: 75.1 FL (ref 80–99)
MONOCYTES # BLD: 1.1 K/UL (ref 0–1)
MONOCYTES NFR BLD: 12 % (ref 5–13)
NEUTS SEG # BLD: 5.2 K/UL (ref 1.8–8)
NEUTS SEG NFR BLD: 59 % (ref 32–75)
NRBC # BLD: 0 K/UL (ref 0–0.01)
NRBC BLD-RTO: 0 PER 100 WBC
PERIPHERAL SMEAR, MD REVIEW: NORMAL
PLATELET # BLD AUTO: 286 K/UL (ref 150–400)
POTASSIUM SERPL-SCNC: 4 MMOL/L (ref 3.5–5.1)
RBC # BLD AUTO: 4.49 M/UL (ref 3.8–5.2)
RBC MORPH BLD: ABNORMAL
RBC MORPH BLD: ABNORMAL
SODIUM SERPL-SCNC: 139 MMOL/L (ref 136–145)
WBC # BLD AUTO: 8.9 K/UL (ref 3.6–11)

## 2024-05-02 PROCEDURE — 36415 COLL VENOUS BLD VENIPUNCTURE: CPT

## 2024-05-02 PROCEDURE — 6370000000 HC RX 637 (ALT 250 FOR IP): Performed by: INTERNAL MEDICINE

## 2024-05-02 PROCEDURE — 94640 AIRWAY INHALATION TREATMENT: CPT

## 2024-05-02 PROCEDURE — 94760 N-INVAS EAR/PLS OXIMETRY 1: CPT

## 2024-05-02 PROCEDURE — 80048 BASIC METABOLIC PNL TOTAL CA: CPT

## 2024-05-02 PROCEDURE — 94761 N-INVAS EAR/PLS OXIMETRY MLT: CPT

## 2024-05-02 PROCEDURE — 85025 COMPLETE CBC W/AUTO DIFF WBC: CPT

## 2024-05-02 RX ORDER — FERROUS SULFATE 324(65)MG
324 TABLET, DELAYED RELEASE (ENTERIC COATED) ORAL
Qty: 90 TABLET | Refills: 0 | Status: SHIPPED | OUTPATIENT
Start: 2024-05-03 | End: 2024-08-01

## 2024-05-02 RX ORDER — MONTELUKAST SODIUM 10 MG/1
10 TABLET ORAL NIGHTLY
Qty: 90 TABLET | Refills: 1 | Status: SHIPPED | OUTPATIENT
Start: 2024-05-02

## 2024-05-02 RX ORDER — DOXYCYCLINE 100 MG/1
100 CAPSULE ORAL EVERY 12 HOURS SCHEDULED
Qty: 14 CAPSULE | Refills: 0 | Status: SHIPPED | OUTPATIENT
Start: 2024-05-02 | End: 2024-05-09

## 2024-05-02 RX ADMIN — CETIRIZINE HYDROCHLORIDE 10 MG: 10 TABLET, FILM COATED ORAL at 09:36

## 2024-05-02 RX ADMIN — Medication 1 CAPSULE: at 09:36

## 2024-05-02 RX ADMIN — FERROUS SULFATE TAB EC 324 MG (65 MG FE EQUIVALENT) 324 MG: 324 (65 FE) TABLET DELAYED RESPONSE at 09:36

## 2024-05-02 RX ADMIN — MOMETASONE FUROATE AND FORMOTEROL FUMARATE DIHYDRATE 2 PUFF: 100; 5 AEROSOL RESPIRATORY (INHALATION) at 07:06

## 2024-05-02 RX ADMIN — DOXYCYCLINE 100 MG: 100 CAPSULE ORAL at 09:36

## 2024-05-02 ASSESSMENT — PAIN SCALES - GENERAL: PAINLEVEL_OUTOF10: 0

## 2024-05-02 NOTE — PLAN OF CARE
Problem: Respiratory - Adult  Goal: Achieves optimal ventilation and oxygenation  Outcome: Progressing  Flowsheets (Taken 5/1/2024 2000)  Achieves optimal ventilation and oxygenation:   Assess for changes in respiratory status   Assess for changes in mentation and behavior     Problem: Pain  Goal: Verbalizes/displays adequate comfort level or baseline comfort level  Outcome: Progressing     Problem: Safety - Adult  Goal: Free from fall injury  Outcome: Progressing

## 2024-05-02 NOTE — DISCHARGE SUMMARY
Urine Negative           Barbiturates, Urine Negative           Cocaine, Urine Negative           PCP, Urine Negative           THC, TH-Cannabinol, Urine Negative              04/28/24 22:25 04/29/24 07:15 04/30/24 05:51 05/01/24 05:50 05/02/24 05:32   WBC 25.2 (H) 25.3 (H) 15.2 (H) 11.0 8.9   RBC 4.58 3.93 4.14 4.16 4.49   Hemoglobin Quant 10.6 (L) 9.2 (L) 9.5 (L) 9.5 (L) 10.2 (L)   Hematocrit 34.0 (L) 29.2 (L) 30.6 (L) 30.4 (L) 33.7 (L)   MCV 74.2 (L) 74.3 (L) 73.9 (L) 73.1 (L) 75.1 (L)   MCH 23.1 (L) 23.4 (L) 22.9 (L) 22.8 (L) 22.7 (L)   MCHC 31.2 31.5 31.0 31.3 30.3   MPV 10.3 10.0 9.9 9.7    RDW 15.2 (H) 15.0 (H) 15.2 (H) 15.1 (H) 14.9 (H)   Platelet Count 364 308 291 313 286   Neutrophils % 87 (H) 84 (H) 70 72 59   Lymphocyte % 4 (L) 4 (L) 13 12 22   Monocytes % 7 10 12 10 12   Eosinophils % 1 1 5 6 7   Basophils % 0 0 0 0 0      04/28/24 22:20   Color, UA YELLOW/STRAW   Glucose, Ur Negative   Bilirubin, Urine Negative   Ketones, Urine Negative   Specific Gravity, UA 1.015   Blood, Urine Negative   Protein, UA Negative   Urobilinogen 0.2   Nitrite, Urine Negative   Leukocyte Esterase, Urine Negative   Appearance HAZY !   pH, Urine 6.0   WBC, UA 0-4   RBC, UA 0-5   Epithelial Cells, UA FEW   Bacteria, UA 1+ !   !: Data is abnormal       04/29/24 07:15   Reticulocyte Count,Automated 1.1   Iron 8 (L)   TIBC 398   Iron % Saturation 2 (L)   Absolute Retic # 0.0441      04/28/24 22:20   HCG(Urine) Pregnancy Test Negative       CULTURE  BC 4/28:  Micrococcus  (suspect contaminant)     BC 4/30:  In process      MRSA 4/30: no present     04/28/24 22:45   Rapid Influenza A By PCR Not detected   Rapid Influenza B By PCR Not detected     Mono 4/29: negative     04/29/24 11:29   Rhinovirus Enterovirus PCR Not detected   Chlamydophila Pneumonia PCR Not detected   Influenza A by PCR Not detected   Parainfluenza 1 PCR Not detected   Parainfluenza 2 PCR Not detected   Parainfluenza 3 PCR Not detected   Parainfluenza 4 PCR Not

## 2024-05-02 NOTE — PLAN OF CARE
Problem: Pain  Goal: Verbalizes/displays adequate comfort level or baseline comfort level  5/2/2024 0953 by Jael Ortega LPN  Outcome: Progressing  5/2/2024 0621 by Mateo James, RN  Outcome: Progressing     Problem: Respiratory - Adult  Goal: Achieves optimal ventilation and oxygenation  5/2/2024 0953 by Jael Ortega LPN  Outcome: Progressing  5/2/2024 0709 by Clifford Quintana Sr., [facility]  Outcome: Progressing  5/2/2024 0621 by Mateo James RN  Outcome: Progressing  Flowsheets (Taken 5/1/2024 2000)  Achieves optimal ventilation and oxygenation:   Assess for changes in respiratory status   Assess for changes in mentation and behavior     Problem: Safety - Adult  Goal: Free from fall injury  5/2/2024 0953 by Jael Ortega LPN  Outcome: Progressing  5/2/2024 0621 by Mateo James, RN  Outcome: Progressing

## 2024-05-02 NOTE — CARE COORDINATION
Transition of Care Plan:     RUR: N/A OBS  Prior Level of Functioning: Independent   Disposition: Home   If SNF or IPR: Date FOC offered: N/A  Date FOC received:   Accepting facility:   Date authorization started with reference number:   Date authorization received and expires:   Follow up appointments: 05/07/24 Talia Cardona NP, Luis JACOBS  DME needed: NO  Transportation at discharge: Mother Sandrine Rincon/POV  IM/IMM Medicare/ letter given: N/A  Is patient a  and connected with VA? N/A              If yes, was  transfer form completed and VA notified?   Caregiver Contact:   Discharge Caregiver contacted prior to discharge?   Care Conference needed?   Barriers to discharge: NONE              05/02/24 1134   Services At/After Discharge   Transition of Care Consult (CM Consult) Discharge Planning;N/A   Services At/After Discharge None    Resource Information Provided? No   Mode of Transport at Discharge Other (see comment)  (POV/Mother Sandrine Rincon)   Condition of Participation: Discharge Planning   The Patient and/or Patient Representative was provided with a Choice of Provider?   (N/A)     Ms. Rincon is being discharged to home today. Her mother Sandrine Rincon will be providing transport. There are no discharge needs. The follow up PCP appointment has been scheduled with Talia Cardona NP for 05/07/24.

## 2024-05-02 NOTE — PLAN OF CARE
Problem: Respiratory - Adult  Goal: Achieves optimal ventilation and oxygenation  5/2/2024 0709 by Clfiford Quintana Sr., RCP  Outcome: Progressing  5/2/2024 0621 by Mateo James, RN  Outcome: Progressing  Flowsheets (Taken 5/1/2024 2000)  Achieves optimal ventilation and oxygenation:   Assess for changes in respiratory status   Assess for changes in mentation and behavior

## 2024-05-02 NOTE — DISCHARGE INSTRUCTIONS
HOSPITALIST RECOMMENDATIONS    Complete course of antibiotics with Doxycycline 100mg twice daily for 7 days  Continue home meds as listed prior to admission  Monitor temperature and seek help for temp elevation >100.5  Plan appt with PCP in about 1 week  SANDY SHANE MD    [phone]

## 2024-05-03 DIAGNOSIS — J45.40 MODERATE PERSISTENT ASTHMA WITHOUT COMPLICATION: ICD-10-CM

## 2024-05-03 LAB
BACTERIA SPEC CULT: ABNORMAL
COMMENT:: NORMAL
L PNEUMO1 AG UR QL IA: NEGATIVE
RICK SF IGG TITR SER IF: NORMAL {TITER}
RICK SF IGM TITR SER IF: NORMAL {TITER}
SERVICE CMNT-IMP: ABNORMAL
SPECIMEN SOURCE: NORMAL

## 2024-05-03 RX ORDER — FLUTICASONE PROPIONATE AND SALMETEROL 250; 50 UG/1; UG/1
POWDER RESPIRATORY (INHALATION)
Qty: 60 EACH | Refills: 11 | Status: SHIPPED | OUTPATIENT
Start: 2024-05-03 | End: 2024-05-03 | Stop reason: SDUPTHER

## 2024-05-03 RX ORDER — FLUTICASONE PROPIONATE AND SALMETEROL 250; 50 UG/1; UG/1
POWDER RESPIRATORY (INHALATION)
Qty: 60 EACH | Refills: 11 | Status: SHIPPED | OUTPATIENT
Start: 2024-05-03

## 2024-05-03 NOTE — TELEPHONE ENCOUNTER
From: Mayda Rincon  To: Carolyn Glez  Sent: 5/2/2024 10:46 PM EDT  Subject: Advair diskus     Hi Dr Glez I realized I made a mistake about my prescription I had recently requested a refill for I put albuterol instead of my advair inhaler and I was just looking to request a refill on my advair diskus 250 inhaler the one I’m on now but I guess when I was at the hospital they took it away because they was updating my medication is there anyway I can get it back on the screen so I can refill it please

## 2024-05-04 LAB
A PHAGOCYTOPH IGG TITR SER IF: NEGATIVE
A PHAGOCYTOPH IGM TITR SER IF: NEGATIVE
COMMENT: NORMAL
E CHAFFEENSIS IGG TITR SER IF: NEGATIVE
E CHAFFEENSIS IGM TITR SER IF: NEGATIVE

## 2024-05-07 ENCOUNTER — OFFICE VISIT (OUTPATIENT)
Age: 24
End: 2024-05-07
Payer: MEDICAID

## 2024-05-07 VITALS
OXYGEN SATURATION: 99 % | RESPIRATION RATE: 18 BRPM | DIASTOLIC BLOOD PRESSURE: 70 MMHG | BODY MASS INDEX: 26.53 KG/M2 | HEART RATE: 87 BPM | SYSTOLIC BLOOD PRESSURE: 110 MMHG | WEIGHT: 169 LBS | HEIGHT: 67 IN | TEMPERATURE: 97.1 F

## 2024-05-07 DIAGNOSIS — J45.40 MODERATE PERSISTENT ASTHMA WITHOUT COMPLICATION: Primary | ICD-10-CM

## 2024-05-07 DIAGNOSIS — J18.9 PNEUMONIA OF RIGHT UPPER LOBE DUE TO INFECTIOUS ORGANISM: ICD-10-CM

## 2024-05-07 DIAGNOSIS — D50.0 IRON DEFICIENCY ANEMIA DUE TO CHRONIC BLOOD LOSS: ICD-10-CM

## 2024-05-07 PROCEDURE — 99214 OFFICE O/P EST MOD 30 MIN: CPT | Performed by: NURSE PRACTITIONER

## 2024-05-07 RX ORDER — FLUTICASONE PROPIONATE AND SALMETEROL 250; 50 UG/1; UG/1
POWDER RESPIRATORY (INHALATION)
Qty: 60 EACH | Refills: 11 | Status: SHIPPED | OUTPATIENT
Start: 2024-05-07

## 2024-05-07 ASSESSMENT — PATIENT HEALTH QUESTIONNAIRE - PHQ9
SUM OF ALL RESPONSES TO PHQ QUESTIONS 1-9: 0
2. FEELING DOWN, DEPRESSED OR HOPELESS: NOT AT ALL
SUM OF ALL RESPONSES TO PHQ QUESTIONS 1-9: 0
1. LITTLE INTEREST OR PLEASURE IN DOING THINGS: NOT AT ALL
SUM OF ALL RESPONSES TO PHQ QUESTIONS 1-9: 0
SUM OF ALL RESPONSES TO PHQ9 QUESTIONS 1 & 2: 0
SUM OF ALL RESPONSES TO PHQ QUESTIONS 1-9: 0

## 2024-05-07 NOTE — PROGRESS NOTES
\"Have you been to the ER, urgent care clinic since your last visit?  Hospitalized since your last visit?\"    \yes 4- [facility] for pneumonia    “Have you seen or consulted any other health care providers outside of [facility] since your last visit?”      no          Click Here for Release of Records Request      Chief Complaint   Patient presents with    Pneumonia     F/u [facility] hospital stay         Vitals:    05/07/24 0928   BP: 110/70   Pulse: 87   Resp: 18   Temp: 97.1 °F (36.2 °C)   SpO2: 99%     
Never   Vaping Use    Vaping Use: Never used   Substance and Sexual Activity    Alcohol use: No    Drug use: Never     Social Determinants of Health     Financial Resource Strain: Low Risk  (4/3/2023)    Overall Financial Resource Strain (CARDIA)     Difficulty of Paying Living Expenses: Not very hard   Food Insecurity: No Food Insecurity (4/29/2024)    Hunger Vital Sign     Worried About Running Out of Food in the Last Year: Never true     Ran Out of Food in the Last Year: Never true   Transportation Needs: No Transportation Needs (4/29/2024)    PRAPARE - Transportation     Lack of Transportation (Medical): No     Lack of Transportation (Non-Medical): No   Physical Activity: Inactive (4/3/2023)    Exercise Vital Sign     Days of Exercise per Week: 0 days     Minutes of Exercise per Session: 0 min   Stress: No Stress Concern Present (4/3/2023)    [employer] [place] [employer] - Occupational Stress Questionnaire     Feeling of Stress : Not at all   Social Connections: Moderately Integrated (4/3/2023)    Social Connection and Isolation Panel [NHANES]     Frequency of Communication with Friends and Family: More than three times a week     Frequency of Social Gatherings with Friends and Family: More than three times a week     Attends Evangelical Services: More than 4 times per year     Active Member of Clubs or Organizations: No     Attends Club or Organization Meetings: More than 4 times per year     Marital Status: Never    Intimate Partner Violence: Not At Risk (4/3/2023)    Humiliation, Afraid, Rape, and Kick questionnaire     Fear of Current or Ex-Partner: No     Emotionally Abused: No     Physically Abused: No     Sexually Abused: No   Housing Stability: Low Risk  (4/29/2024)    Housing Stability Vital Sign     Unable to Pay for Housing in the Last Year: No     Number of Places Lived in the Last Year: 1     Unstable Housing in the Last Year: No       Family History   Problem Relation Age of Onset

## 2024-05-09 LAB
BACTERIA SPEC CULT: NORMAL
BACTERIA SPEC CULT: NORMAL
SERVICE CMNT-IMP: NORMAL
SERVICE CMNT-IMP: NORMAL

## 2024-05-29 DIAGNOSIS — J45.40 MODERATE PERSISTENT ASTHMA WITHOUT COMPLICATION: ICD-10-CM

## 2024-05-29 RX ORDER — FLUTICASONE PROPIONATE AND SALMETEROL 250; 50 UG/1; UG/1
POWDER RESPIRATORY (INHALATION)
Qty: 60 EACH | Refills: 11 | Status: SHIPPED | OUTPATIENT
Start: 2024-05-29

## 2024-06-17 NOTE — TELEPHONE ENCOUNTER
From: Mayda Rincon  To: Office of Carolyn Glez  Sent: 6/14/2024 9:19 PM EDT  Subject: Medication Renewal Request    Refills have been requested for the following medications:     ibuprofen (ADVIL;MOTRIN) 800 MG tablet [Carolyn Glez, APRN - NP]    Preferred pharmacy[place] [id number] [address] [facility] - P [phone] - F [phone]

## 2024-06-18 RX ORDER — IBUPROFEN 800 MG/1
800 TABLET ORAL 2 TIMES DAILY PRN
Qty: 60 TABLET | Refills: 5 | Status: SHIPPED | OUTPATIENT
Start: 2024-06-18

## 2024-07-02 DIAGNOSIS — J45.40 MODERATE PERSISTENT ASTHMA WITHOUT COMPLICATION: ICD-10-CM

## 2024-07-02 RX ORDER — FLUTICASONE PROPIONATE AND SALMETEROL 250; 50 UG/1; UG/1
POWDER RESPIRATORY (INHALATION)
Qty: 60 EACH | Refills: 11 | Status: SHIPPED | OUTPATIENT
Start: 2024-07-02

## 2024-07-02 NOTE — TELEPHONE ENCOUNTER
Patient requesting refill on     Requested Prescriptions     Pending Prescriptions Disp Refills    fluticasone-salmeterol (ADVAIR DISKUS) 250-50 MCG/ACT AEPB diskus inhaler 60 each 11     Sig: INHALE 1 PUFF BY MOUTH TWICE DAILY        Last OV 5/7/2024

## 2024-07-02 NOTE — TELEPHONE ENCOUNTER
From: Mayda Rincon  To: Office of Carolyn Glez  Sent: 7/1/2024 8:30 PM EDT  Subject: Medication Renewal Request    Refills have been requested for the following medications:     fluticasone-salmeterol (ADVAIR DISKUS) 250-50 MCG/ACT AEPB diskus inhaler [Carolyn Glez, APRN - NP]   Patient Comment: Needs to be transferred to [facility]     Preferred pharmacy: [facility] #[id number] [address] - P [phone] - F [phone]

## 2024-08-28 DIAGNOSIS — J45.40 MODERATE PERSISTENT ASTHMA WITHOUT COMPLICATION: ICD-10-CM

## 2024-08-28 RX ORDER — FLUTICASONE PROPIONATE AND SALMETEROL 250; 50 UG/1; UG/1
POWDER RESPIRATORY (INHALATION)
Qty: 60 EACH | Refills: 11 | Status: SHIPPED | OUTPATIENT
Start: 2024-08-28

## 2024-08-28 NOTE — TELEPHONE ENCOUNTER
Mayda Lee [facility] Clinical Staff9 hours ago (5:00 AM)       Refills have been requested for the following medications:         fluticasone-salmeterol (ADVAIR DISKUS) 250-50 MCG/ACT AEPB diskus inhaler [KALEY Wei - NP]     Preferred pharmacy: [facility] #[id number] [address] - P [phone] - F [phone]

## 2024-10-07 RX ORDER — IBUPROFEN 800 MG/1
800 TABLET, FILM COATED ORAL 2 TIMES DAILY PRN
Qty: 60 TABLET | Refills: 5 | Status: SHIPPED | OUTPATIENT
Start: 2024-10-07

## 2024-11-22 DIAGNOSIS — J45.40 MODERATE PERSISTENT ASTHMA WITHOUT COMPLICATION: ICD-10-CM

## 2024-11-25 RX ORDER — FLUTICASONE PROPIONATE AND SALMETEROL 250; 50 UG/1; UG/1
POWDER RESPIRATORY (INHALATION)
Qty: 60 EACH | Refills: 11 | Status: SHIPPED | OUTPATIENT
Start: 2024-11-25

## 2024-12-16 DIAGNOSIS — J45.40 MODERATE PERSISTENT ASTHMA WITHOUT COMPLICATION: ICD-10-CM

## 2024-12-16 RX ORDER — FLUTICASONE PROPIONATE AND SALMETEROL 250; 50 UG/1; UG/1
POWDER RESPIRATORY (INHALATION)
Qty: 60 EACH | Refills: 11 | OUTPATIENT
Start: 2024-12-16

## 2024-12-27 RX ORDER — IBUPROFEN 800 MG/1
800 TABLET, FILM COATED ORAL 2 TIMES DAILY PRN
Qty: 60 TABLET | Refills: 5 | Status: SHIPPED | OUTPATIENT
Start: 2024-12-27

## 2025-02-28 DIAGNOSIS — J45.40 MODERATE PERSISTENT ASTHMA WITHOUT COMPLICATION: ICD-10-CM

## 2025-02-28 RX ORDER — FLUTICASONE PROPIONATE AND SALMETEROL 250; 50 UG/1; UG/1
POWDER RESPIRATORY (INHALATION)
Qty: 60 EACH | Refills: 11 | Status: SHIPPED | OUTPATIENT
Start: 2025-02-28

## 2025-03-03 DIAGNOSIS — J45.40 MODERATE PERSISTENT ASTHMA WITHOUT COMPLICATION: ICD-10-CM

## 2025-03-03 RX ORDER — FLUTICASONE PROPIONATE AND SALMETEROL 250; 50 UG/1; UG/1
POWDER RESPIRATORY (INHALATION)
Qty: 60 EACH | Refills: 11 | Status: SHIPPED | OUTPATIENT
Start: 2025-03-03
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
## 2025-04-25 DIAGNOSIS — J45.40 MODERATE PERSISTENT ASTHMA WITHOUT COMPLICATION: ICD-10-CM

## 2025-04-25 RX ORDER — FLUTICASONE PROPIONATE AND SALMETEROL 250; 50 UG/1; UG/1
POWDER RESPIRATORY (INHALATION)
Qty: 60 EACH | Refills: 11 | Status: SHIPPED | OUTPATIENT
Start: 2025-04-25

## 2025-04-25 RX ORDER — IBUPROFEN 800 MG/1
800 TABLET, FILM COATED ORAL 2 TIMES DAILY PRN
Qty: 60 TABLET | Refills: 5 | Status: SHIPPED | OUTPATIENT
Start: 2025-04-25

## 2025-05-13 ENCOUNTER — TELEPHONE (OUTPATIENT)
Age: 25
End: 2025-05-13

## 2025-05-13 DIAGNOSIS — H10.33 ACUTE BACTERIAL CONJUNCTIVITIS OF BOTH EYES: ICD-10-CM

## 2025-05-13 RX ORDER — FLUTICASONE PROPIONATE 50 MCG
2 SPRAY, SUSPENSION (ML) NASAL DAILY
Qty: 48 G | Refills: 1 | OUTPATIENT
Start: 2025-05-13

## 2025-05-13 NOTE — TELEPHONE ENCOUNTER
fluticasone-salmeterol (ADVAIR DISKUS) 250-50 MCG/ACT AEPB diskus inhaler [Carolyn Glez, APRN - NP]      Patient Comment: I lost my inhaler and i need a new one please      Preferred pharmacy: [facility] #[id number] - [place], [place] - [address] - P [phone] - F [phone]

## 2025-07-21 RX ORDER — ALBUTEROL SULFATE 0.83 MG/ML
2.5 SOLUTION RESPIRATORY (INHALATION) EVERY 4 HOURS PRN
Qty: 3 ML | Refills: 5 | Status: SHIPPED | OUTPATIENT
Start: 2025-07-21

## 2025-07-21 RX ORDER — IBUPROFEN 800 MG/1
800 TABLET, FILM COATED ORAL 2 TIMES DAILY PRN
Qty: 60 TABLET | Refills: 5 | OUTPATIENT
Start: 2025-07-21

## 2025-07-23 DIAGNOSIS — J45.40 MODERATE PERSISTENT ASTHMA WITHOUT COMPLICATION: ICD-10-CM

## 2025-07-23 RX ORDER — IBUPROFEN 800 MG/1
800 TABLET, FILM COATED ORAL 2 TIMES DAILY PRN
Qty: 60 TABLET | Refills: 0 | Status: SHIPPED | OUTPATIENT
Start: 2025-07-23

## 2025-07-23 RX ORDER — FLUTICASONE PROPIONATE AND SALMETEROL 250; 50 UG/1; UG/1
POWDER RESPIRATORY (INHALATION)
Qty: 60 EACH | Refills: 0 | Status: SHIPPED | OUTPATIENT
Start: 2025-07-23